# Patient Record
Sex: MALE | Race: ASIAN | NOT HISPANIC OR LATINO
[De-identification: names, ages, dates, MRNs, and addresses within clinical notes are randomized per-mention and may not be internally consistent; named-entity substitution may affect disease eponyms.]

---

## 2021-09-13 PROBLEM — Z00.00 ENCOUNTER FOR PREVENTIVE HEALTH EXAMINATION: Status: ACTIVE | Noted: 2021-09-13

## 2021-09-28 ENCOUNTER — APPOINTMENT (OUTPATIENT)
Dept: OTOLARYNGOLOGY | Facility: CLINIC | Age: 56
End: 2021-09-28
Payer: MEDICAID

## 2021-09-28 VITALS
HEIGHT: 73.23 IN | OXYGEN SATURATION: 98 % | SYSTOLIC BLOOD PRESSURE: 137 MMHG | WEIGHT: 269 LBS | DIASTOLIC BLOOD PRESSURE: 105 MMHG | HEART RATE: 84 BPM | BODY MASS INDEX: 35.27 KG/M2

## 2021-09-28 VITALS — TEMPERATURE: 97.2 F

## 2021-09-28 DIAGNOSIS — Z87.891 PERSONAL HISTORY OF NICOTINE DEPENDENCE: ICD-10-CM

## 2021-09-28 DIAGNOSIS — Z80.1 FAMILY HISTORY OF MALIGNANT NEOPLASM OF TRACHEA, BRONCHUS AND LUNG: ICD-10-CM

## 2021-09-28 DIAGNOSIS — B19.10 UNSPECIFIED VIRAL HEPATITIS B W/OUT HEPATIC COMA: ICD-10-CM

## 2021-09-28 PROCEDURE — 99204 OFFICE O/P NEW MOD 45 MIN: CPT

## 2021-09-28 RX ORDER — TENOFOVIR ALAFENAMIDE 25 MG/1
25 TABLET ORAL
Refills: 0 | Status: ACTIVE | COMMUNITY

## 2021-09-28 NOTE — PHYSICAL EXAM
[de-identified] : 5 cm soft mass of the submental area. [Midline] : trachea located in midline position [Normal] : no rashes

## 2021-09-28 NOTE — REASON FOR VISIT
[Initial Evaluation] : an initial evaluation for [Other: _____] : [unfilled] [[employer] ] : provided by [employer]   [FreeTextEntry2] : submandibular lipoma [Interpreters_IDNumber] : [id number] [Interpreters_FullName] : selene [FreeTextEntry3] : mandarin

## 2021-09-28 NOTE — CONSULT LETTER
[Dear  ___] : Dear  [unfilled], [Consult Letter:] : I had the pleasure of evaluating your patient, [unfilled]. [Please see my note below.] : Please see my note below. [Consult Closing:] : Thank you very much for allowing me to participate in the care of this patient.  If you have any questions, please do not hesitate to contact me. [Sincerely,] : Sincerely, [FreeTextEntry2] : Dr Julio Stevenson [FreeTextEntry3] : \par Bran Sanabria MD, FACS\par \par Otolaryngology-Head and Neck Surgery\par [employer] [facility]\par

## 2021-09-28 NOTE — HISTORY OF PRESENT ILLNESS
[de-identified] : 66 yro male pt referred by Dr. 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 Pt has had lipoma for 10 years, was much smaller before. Pt got CT done and subsequent US for thyroid. Denies pain, dysphagia, dyspnea, dysphonia. No fever, chills, weight loss. Eating and drinking without issues. \par Smoked 1 pack cig/day for 8 years. Quit 10 years ago. \par \par \par CT neck ([facility]) 9/6/2021:\par 1) left submandibular 7.4cm lipoma\par 2) indeterminate findings of right fossa of Rosenmuller effacement, asymmetric left lingual tonsil surface nodularity and left piriform sinus effacement. \par 3) left tracheoesophageal groove indeterminate 1.6cm nodular lesion\par 4) age indeterminate T5 vertebral body mild compression deformity. \par \par US thyroid ([facility]) 9/16/2021:\par -1.2cm hyperechoic lesion at the inferior aspect of the lower left pole.\par -cervical lymph nodes. \par PTH from 9/23/21 was 57.6 and Ca was 10.3.\par

## 2021-10-06 ENCOUNTER — OUTPATIENT (OUTPATIENT)
Dept: OUTPATIENT SERVICES | Facility: HOSPITAL | Age: 56
LOS: 1 days | End: 2021-10-06
Payer: MEDICAID

## 2021-10-06 VITALS
WEIGHT: 266.98 LBS | SYSTOLIC BLOOD PRESSURE: 138 MMHG | DIASTOLIC BLOOD PRESSURE: 86 MMHG | OXYGEN SATURATION: 98 % | RESPIRATION RATE: 18 BRPM | TEMPERATURE: 98 F | HEART RATE: 84 BPM | HEIGHT: 73 IN

## 2021-10-06 DIAGNOSIS — E21.3 HYPERPARATHYROIDISM, UNSPECIFIED: ICD-10-CM

## 2021-10-06 DIAGNOSIS — Z87.19 PERSONAL HISTORY OF OTHER DISEASES OF THE DIGESTIVE SYSTEM: Chronic | ICD-10-CM

## 2021-10-06 DIAGNOSIS — Z01.812 ENCOUNTER FOR PREPROCEDURAL LABORATORY EXAMINATION: ICD-10-CM

## 2021-10-06 DIAGNOSIS — B19.10 UNSPECIFIED VIRAL HEPATITIS B WITHOUT HEPATIC COMA: ICD-10-CM

## 2021-10-06 DIAGNOSIS — R06.00 DYSPNEA, UNSPECIFIED: ICD-10-CM

## 2021-10-06 LAB
ALBUMIN SERPL ELPH-MCNC: 4.9 G/DL — SIGNIFICANT CHANGE UP (ref 3.3–5)
ALP SERPL-CCNC: 56 U/L — SIGNIFICANT CHANGE UP (ref 40–120)
ALT FLD-CCNC: 13 U/L — SIGNIFICANT CHANGE UP (ref 4–41)
ANION GAP SERPL CALC-SCNC: 11 MMOL/L — SIGNIFICANT CHANGE UP (ref 7–14)
APTT BLD: 40.6 SEC — HIGH (ref 27–36.3)
AST SERPL-CCNC: 17 U/L — SIGNIFICANT CHANGE UP (ref 4–40)
BILIRUB SERPL-MCNC: 0.7 MG/DL — SIGNIFICANT CHANGE UP (ref 0.2–1.2)
BLD GP AB SCN SERPL QL: NEGATIVE — SIGNIFICANT CHANGE UP
BUN SERPL-MCNC: 13 MG/DL — SIGNIFICANT CHANGE UP (ref 7–23)
CALCIUM SERPL-MCNC: 10.3 MG/DL — SIGNIFICANT CHANGE UP (ref 8.4–10.5)
CHLORIDE SERPL-SCNC: 105 MMOL/L — SIGNIFICANT CHANGE UP (ref 98–107)
CO2 SERPL-SCNC: 23 MMOL/L — SIGNIFICANT CHANGE UP (ref 22–31)
CREAT SERPL-MCNC: 0.79 MG/DL — SIGNIFICANT CHANGE UP (ref 0.5–1.3)
GLUCOSE SERPL-MCNC: 108 MG/DL — HIGH (ref 70–99)
HCT VFR BLD CALC: 43.7 % — SIGNIFICANT CHANGE UP (ref 39–50)
HGB BLD-MCNC: 15.2 G/DL — SIGNIFICANT CHANGE UP (ref 13–17)
INR BLD: 1.06 RATIO — SIGNIFICANT CHANGE UP (ref 0.88–1.16)
MCHC RBC-ENTMCNC: 32.4 PG — SIGNIFICANT CHANGE UP (ref 27–34)
MCHC RBC-ENTMCNC: 34.8 GM/DL — SIGNIFICANT CHANGE UP (ref 32–36)
MCV RBC AUTO: 93.2 FL — SIGNIFICANT CHANGE UP (ref 80–100)
NRBC # BLD: 0 /100 WBCS — SIGNIFICANT CHANGE UP
NRBC # FLD: 0 K/UL — SIGNIFICANT CHANGE UP
PLATELET # BLD AUTO: 149 K/UL — LOW (ref 150–400)
POTASSIUM SERPL-MCNC: 4.2 MMOL/L — SIGNIFICANT CHANGE UP (ref 3.5–5.3)
POTASSIUM SERPL-SCNC: 4.2 MMOL/L — SIGNIFICANT CHANGE UP (ref 3.5–5.3)
PROT SERPL-MCNC: 8 G/DL — SIGNIFICANT CHANGE UP (ref 6–8.3)
PROTHROM AB SERPL-ACNC: 12.2 SEC — SIGNIFICANT CHANGE UP (ref 10.6–13.6)
RBC # BLD: 4.69 M/UL — SIGNIFICANT CHANGE UP (ref 4.2–5.8)
RBC # FLD: 12.4 % — SIGNIFICANT CHANGE UP (ref 10.3–14.5)
RH IG SCN BLD-IMP: POSITIVE — SIGNIFICANT CHANGE UP
SODIUM SERPL-SCNC: 139 MMOL/L — SIGNIFICANT CHANGE UP (ref 135–145)
WBC # BLD: 4.81 K/UL — SIGNIFICANT CHANGE UP (ref 3.8–10.5)
WBC # FLD AUTO: 4.81 K/UL — SIGNIFICANT CHANGE UP (ref 3.8–10.5)

## 2021-10-06 PROCEDURE — 93010 ELECTROCARDIOGRAM REPORT: CPT

## 2021-10-06 NOTE — H&P PST ADULT - NSICDXPASTMEDICALHX_GEN_ALL_CORE_FT
PAST MEDICAL HISTORY:  Benign lipomatous neoplasm of skin and subcutaneous tissue of head, face and neck     Hepatitis B on Vemlidy    Hyperparathyroidism, unspecified      PAST MEDICAL HISTORY:  Benign lipomatous neoplasm of skin and subcutaneous tissue of head, face and neck     Hepatitis B on Vemlidy    Hyperparathyroidism, unspecified     Obesity

## 2021-10-06 NOTE — H&P PST ADULT - CARDIOVASCULAR COMMENTS
Dad came in to  at . Verified with ID.  
Mom is req a 3 mo supply for both meds.    Adderall and Vyvanse      Last Written Prescription Date:  1-3-18, 10-9-17  Last Fill Quantity: 60, 30,   # refills: 0 for both  Last Office Visit: 1-17-18 throat pain, boxer's fx  Future Office visit:       Routing refill request to provider for review/approval because:  Drug not on the [facility], [facility] or [facility] refill protocol or controlled substance    Please advise, thanks.    Call mom when rx's ready for p/u.  
Parent called. Rx's at  for   
Reason for Call:  Medication or medication refill:    Do you use a [facility]?  Name of the pharmacy and phone number for the current request:  P/U    Name of the medication requested: Vivanse & adderall    Other request: Isabella is asking for a 3 month supply for both meds.    Can we leave a detailed message on this number? YES    Phone number patient can be reached at: Home number on file [phone] (home)    Best Time: any    Call taken on 1/31/2018 at 2:41 PM by Vannessa Mcqueen      
patient reports intermittent left sided chest pain radiating to left shoulder for several years

## 2021-10-06 NOTE — H&P PST ADULT - PROBLEM SELECTOR PLAN 1
Patient scheduled for parathyroidectomy with parathyroid hormone assay excision of tumor neck deep intramuscular on 10/13/2021  Written & verbal preop instructions, gi prophylaxis & surgical soap given  Pt verbalized good understanding.  Teach back done on surgical soap instructions.  DARY precaution recommended

## 2021-10-06 NOTE — H&P PST ADULT - PROBLEM SELECTOR PLAN 2
Patient reports dyspnea on exertion and intermittent left sided chest pain.  EKG done in chart.  Patient had preop Cardiology evaluation, copy requested, copy of Echo and Stress test requested.

## 2021-10-06 NOTE — H&P PST ADULT - PROBLEM SELECTOR PLAN 3
Patient instructed to take Vemlidy on AM of surgery with small sip of water. Patient had recent evaluation with GI, requesting last note.

## 2021-10-06 NOTE — H&P PST ADULT - NEGATIVE ENMT SYMPTOMS
Yes
no hearing difficulty/no ear pain/no tinnitus/no vertigo/no sinus symptoms/no nose bleeds/no gum bleeding/no throat pain/no dysphagia

## 2021-10-06 NOTE — H&P PST ADULT - OTHER CARE PROVIDERS
Dr. Christopher Stevenson ENT, Dr. Rivero, Dr. Terrell Cagle Dr. Zia Kim [facility] [phone], Dr. Rivero, Dr. Terrell Cagle  [phone] Dr. Zia Kim [facility] [phone], Dr. Rivero Cardiologist [phone], Dr. Terrell Cagle  [phone]

## 2021-10-06 NOTE — H&P PST ADULT - NSANTHOSAYNRD_GEN_A_CORE
No. DARY screening performed.  STOP BANG Legend: 0-2 = LOW Risk; 3-4 = INTERMEDIATE Risk; 5-8 = HIGH Risk

## 2021-10-06 NOTE — H&P PST ADULT - HISTORY OF PRESENT ILLNESS
57 yo male presents to [facility] for preop evaluation for parathyroidectomy with parathyroid hormone assay excision of tumor neck deep intramuscular.  Patient report lipoma on neck over that past ten years that has increased in size.  Patient had diagnostic imaging and also noted to have elevated calcium and PTH level.  Patient diagnosed with hyperparathyroidism unspecified. Patient denies throat pain or dysphagia.

## 2021-10-06 NOTE — H&P PST ADULT - NEGATIVE NEUROLOGICAL SYMPTOMS
no weakness/no generalized seizures/no focal seizures/no syncope/no tremors/no vertigo/no difficulty walking/no headache

## 2021-10-07 ENCOUNTER — APPOINTMENT (OUTPATIENT)
Dept: CT IMAGING | Facility: IMAGING CENTER | Age: 56
End: 2021-10-07
Payer: MEDICAID

## 2021-10-07 ENCOUNTER — OUTPATIENT (OUTPATIENT)
Dept: OUTPATIENT SERVICES | Facility: HOSPITAL | Age: 56
LOS: 1 days | End: 2021-10-07
Payer: MEDICAID

## 2021-10-07 DIAGNOSIS — D17.0 BENIGN LIPOMATOUS NEOPLASM OF SKIN AND SUBCUTANEOUS TISSUE OF HEAD, FACE AND NECK: ICD-10-CM

## 2021-10-07 PROBLEM — B19.10 UNSPECIFIED VIRAL HEPATITIS B WITHOUT HEPATIC COMA: Chronic | Status: ACTIVE | Noted: 2021-10-06

## 2021-10-07 PROBLEM — E21.3 HYPERPARATHYROIDISM, UNSPECIFIED: Chronic | Status: ACTIVE | Noted: 2021-10-06

## 2021-10-07 PROCEDURE — 70492 CT SFT TSUE NCK W/O & W/DYE: CPT

## 2021-10-07 PROCEDURE — 70491 CT SOFT TISSUE NECK W/DYE: CPT | Mod: 26

## 2021-10-07 PROCEDURE — 82565 ASSAY OF CREATININE: CPT

## 2021-10-10 ENCOUNTER — APPOINTMENT (OUTPATIENT)
Dept: DISASTER EMERGENCY | Facility: CLINIC | Age: 56
End: 2021-10-10

## 2021-10-10 DIAGNOSIS — Z01.818 ENCOUNTER FOR OTHER PREPROCEDURAL EXAMINATION: ICD-10-CM

## 2021-10-11 LAB — SARS-COV-2 N GENE NPH QL NAA+PROBE: NOT DETECTED

## 2021-10-12 ENCOUNTER — TRANSCRIPTION ENCOUNTER (OUTPATIENT)
Age: 56
End: 2021-10-12

## 2021-10-12 NOTE — ASU PATIENT PROFILE, ADULT - NSICDXPASTMEDICALHX_GEN_ALL_CORE_FT
PAST MEDICAL HISTORY:  Benign lipomatous neoplasm of skin and subcutaneous tissue of head, face and neck     Hepatitis B on Vemlidy    Hyperparathyroidism, unspecified     Obesity

## 2021-10-13 ENCOUNTER — OUTPATIENT (OUTPATIENT)
Dept: OUTPATIENT SERVICES | Facility: HOSPITAL | Age: 56
LOS: 1 days | Discharge: HOME | End: 2021-10-13
Payer: MEDICAID

## 2021-10-13 ENCOUNTER — APPOINTMENT (OUTPATIENT)
Dept: OTOLARYNGOLOGY | Facility: HOSPITAL | Age: 56
End: 2021-10-13

## 2021-10-13 ENCOUNTER — RESULT REVIEW (OUTPATIENT)
Age: 56
End: 2021-10-13

## 2021-10-13 VITALS
HEIGHT: 73 IN | WEIGHT: 264.55 LBS | SYSTOLIC BLOOD PRESSURE: 140 MMHG | DIASTOLIC BLOOD PRESSURE: 89 MMHG | HEART RATE: 94 BPM | OXYGEN SATURATION: 97 % | TEMPERATURE: 98 F | RESPIRATION RATE: 18 BRPM

## 2021-10-13 VITALS
SYSTOLIC BLOOD PRESSURE: 131 MMHG | DIASTOLIC BLOOD PRESSURE: 87 MMHG | TEMPERATURE: 97 F | HEART RATE: 94 BPM | OXYGEN SATURATION: 100 % | RESPIRATION RATE: 16 BRPM

## 2021-10-13 DIAGNOSIS — E21.3 HYPERPARATHYROIDISM, UNSPECIFIED: ICD-10-CM

## 2021-10-13 LAB
PTH INTACT, INTRAOP SPECIMEN 2: SIGNIFICANT CHANGE UP
PTH INTACT, INTRAOP SPECIMEN 3: SIGNIFICANT CHANGE UP
PTH INTACT, INTRAOP SPECIMEN 4: SIGNIFICANT CHANGE UP
PTH INTACT, INTRAOP TIMING 2: SIGNIFICANT CHANGE UP
PTH INTACT, INTRAOP TIMING 3: SIGNIFICANT CHANGE UP
PTH INTACT, INTRAOP TIMING 4: SIGNIFICANT CHANGE UP
PTH INTACT, INTRAOPERATIVE 2: 113 PG/ML — HIGH (ref 15–65)
PTH INTACT, INTRAOPERATIVE 3: 103 PG/ML — HIGH (ref 15–65)
PTH INTACT, INTRAOPERATIVE 4: 96 PG/ML — HIGH (ref 15–65)
PTH-INTACT IO % DIF SERPL: 83 PG/ML — HIGH (ref 15–65)
RH IG SCN BLD-IMP: POSITIVE — SIGNIFICANT CHANGE UP

## 2021-10-13 PROCEDURE — 88331 PATH CONSLTJ SURG 1 BLK 1SPC: CPT | Mod: 26

## 2021-10-13 PROCEDURE — 21554 EXC NECK TUM DEEP 5 CM/>: CPT

## 2021-10-13 PROCEDURE — 60500 EXPLORE PARATHYROID GLANDS: CPT

## 2021-10-13 PROCEDURE — 88305 TISSUE EXAM BY PATHOLOGIST: CPT | Mod: 26

## 2021-10-13 PROCEDURE — 21558 RESECT NECK TUMOR 5 CM/>: CPT

## 2021-10-13 RX ORDER — METOCLOPRAMIDE HCL 10 MG
10 TABLET ORAL ONCE
Refills: 0 | Status: DISCONTINUED | OUTPATIENT
Start: 2021-10-13 | End: 2021-10-27

## 2021-10-13 RX ORDER — ONDANSETRON 8 MG/1
4 TABLET, FILM COATED ORAL ONCE
Refills: 0 | Status: DISCONTINUED | OUTPATIENT
Start: 2021-10-13 | End: 2021-10-27

## 2021-10-13 RX ORDER — HYDROMORPHONE HYDROCHLORIDE 2 MG/ML
0.5 INJECTION INTRAMUSCULAR; INTRAVENOUS; SUBCUTANEOUS
Refills: 0 | Status: DISCONTINUED | OUTPATIENT
Start: 2021-10-13 | End: 2021-10-13

## 2021-10-13 RX ORDER — OXYCODONE HYDROCHLORIDE 5 MG/1
5 TABLET ORAL ONCE
Refills: 0 | Status: DISCONTINUED | OUTPATIENT
Start: 2021-10-13 | End: 2021-10-13

## 2021-10-13 RX ORDER — OXYCODONE HYDROCHLORIDE 5 MG/1
1 TABLET ORAL
Qty: 12 | Refills: 0
Start: 2021-10-13

## 2021-10-13 NOTE — ASU DISCHARGE PLAN (ADULT/PEDIATRIC) - FOLLOW UP APPOINTMENTS
or go to the nearest emergency room/911 or go to the nearest emergency room/[facility]

## 2021-10-13 NOTE — BRIEF OPERATIVE NOTE - NSICDXBRIEFPROCEDURE_GEN_ALL_CORE_FT
PROCEDURES:  Bilateral parathyroidectomy 13-Oct-2021 10:39:57  Brenda Nickerson  Removal of lipoma of neck, 3 cm or greater 13-Oct-2021 10:41:34  Brenda Nickerson

## 2021-10-13 NOTE — BRIEF OPERATIVE NOTE - NSICDXBRIEFPREOP_GEN_ALL_CORE_FT
PRE-OP DIAGNOSIS:  Lipoma 13-Oct-2021 10:42:07  Brenda Nickerson  Hyperparathyroidism 13-Oct-2021 10:42:26  Brenda Nickerson

## 2021-10-13 NOTE — ASU DISCHARGE PLAN (ADULT/PEDIATRIC) - CALL YOUR DOCTOR IF YOU HAVE ANY OF THE FOLLOWING:
Bleeding that does not stop/Swelling that gets worse/Wound/Surgical Site with redness, or foul smelling discharge or pus Bleeding that does not stop/Swelling that gets worse/Wound/Surgical Site with redness, or foul smelling discharge or pus/Numbness, tingling, color or temperature change to extremity/Nausea and vomiting that does not stop/Unable to urinate/Inability to tolerate liquids or foods/Increased irritability or sluggishness

## 2021-10-13 NOTE — ASU DISCHARGE PLAN (ADULT/PEDIATRIC) - CARE PROVIDER_API CALL
Bran Sanabria)  Otolaryngology  [address] [place] [address]  Phone: [phone]  Fax: [phone]  Established Patient  Follow Up Time:

## 2021-10-13 NOTE — BRIEF OPERATIVE NOTE - NSICDXBRIEFPOSTOP_GEN_ALL_CORE_FT
POST-OP DIAGNOSIS:  Hyperparathyroidism 13-Oct-2021 10:42:37  Brenda Nickerson  Lipoma 13-Oct-2021 10:42:47  Brenda Nickerson

## 2021-10-15 ENCOUNTER — APPOINTMENT (OUTPATIENT)
Dept: OTOLARYNGOLOGY | Facility: CLINIC | Age: 56
End: 2021-10-15
Payer: MEDICAID

## 2021-10-15 PROCEDURE — 99024 POSTOP FOLLOW-UP VISIT: CPT

## 2021-10-15 NOTE — HISTORY OF PRESENT ILLNESS
[de-identified] : 56 yro male pt here for drain removal s/p parathyroidectomy and excision of tumor neck deep intramuscular on 10/13/2021. Drain has less than 20cc of serosanguineous fluid over the last 24 hours. Pt has some throat tenderness. Denies dysphagia, dyspnea, dysphonia. No fever, chills, or tingling in extremities.

## 2021-10-17 ENCOUNTER — TRANSCRIPTION ENCOUNTER (OUTPATIENT)
Age: 56
End: 2021-10-17

## 2021-10-18 LAB — SURGICAL PATHOLOGY STUDY: SIGNIFICANT CHANGE UP

## 2021-11-04 ENCOUNTER — APPOINTMENT (OUTPATIENT)
Dept: OTOLARYNGOLOGY | Facility: CLINIC | Age: 56
End: 2021-11-04
Payer: MEDICAID

## 2021-11-04 VITALS
TEMPERATURE: 96.9 F | DIASTOLIC BLOOD PRESSURE: 95 MMHG | WEIGHT: 269 LBS | SYSTOLIC BLOOD PRESSURE: 150 MMHG | HEIGHT: 73 IN | HEART RATE: 94 BPM | BODY MASS INDEX: 35.65 KG/M2

## 2021-11-04 PROCEDURE — 99024 POSTOP FOLLOW-UP VISIT: CPT

## 2021-11-04 NOTE — HISTORY OF PRESENT ILLNESS
[de-identified] : 56 yro male pt referred by Dr. 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 Pt here for follow-up  s/p parathyroidectomy and excision of tumor neck deep intramuscular on 10/13/2021.  Patient reports constant odynophagia on left side of neck when eating meals and drink. Denies pain, dysphagia, dyspnea, dysphonia, fever, chills or weight loss. \par \par \par surgical path 10/13/2021:\par Final Diagnosis\par \par 1.  Parathyroid, left inferior, parathyroidectomy\par \par - Hypercellular parathyroid tissue\par \par 2. Soft tissue, left neck mass, excision\par - Lipoma\par - 1 tiny benign lymph node\par \par 3. Parathyroid, rising  inferior, parathyroidectomy\par - Hypercellular parathyroid tissue\par \par \par CT neck ([facility]) 9/6/2021:\par 1) left submandibular 7.4cm lipoma\par 2) indeterminate findings of right fossa of Rosenmuller effacement, asymmetric left lingual tonsil surface nodularity and left piriform sinus effacement. \par 3) left tracheoesophageal groove indeterminate 1.6cm nodular lesion\par 4) age indeterminate T5 vertebral body mild compression deformity. \par \par US thyroid ([facility]) 9/16/2021:\par -1.2cm hyperechoic lesion at the inferior aspect of the lower left pole.\par -cervical lymph nodes. \par PTH from 9/23/21 was 57.6 and Ca was 10.3.\par

## 2021-11-04 NOTE — CONSULT LETTER
[Dear  ___] : Dear  [unfilled], [Courtesy Letter:] : I had the pleasure of seeing your patient, [unfilled], in my office today. [Please see my note below.] : Please see my note below. [Consult Closing:] : Thank you very much for allowing me to participate in the care of this patient.  If you have any questions, please do not hesitate to contact me. [Sincerely,] : Sincerely, [FreeTextEntry2] : Dr Julio Stevenson [FreeTextEntry3] : \par Bran Sanabria MD, FACS\par \par Otolaryngology-Head and Neck Surgery\par [employer] [facility]\par

## 2021-11-04 NOTE — PHYSICAL EXAM
[Midline] : trachea located in midline position [Normal] : no rashes [de-identified] : Incision C/D/I.

## 2021-11-04 NOTE — REASON FOR VISIT
[Subsequent Evaluation] : a subsequent evaluation for [Other: _____] : [unfilled] [[employer] ] : provided by [employer]   [FreeTextEntry2] : submandibular lipoma [Interpreters_IDNumber] : [id number] [Interpreters_FullName] : selene [FreeTextEntry3] : mandarin

## 2021-11-17 LAB
25(OH)D3 SERPL-MCNC: 23.5 NG/ML
CALCIUM SERPL-MCNC: 10.5 MG/DL
CALCIUM SERPL-MCNC: 10.5 MG/DL
PARATHYROID HORMONE INTACT: 75 PG/ML

## 2021-12-01 ENCOUNTER — APPOINTMENT (OUTPATIENT)
Dept: ENDOCRINOLOGY | Facility: CLINIC | Age: 56
End: 2021-12-01
Payer: MEDICAID

## 2021-12-01 VITALS
OXYGEN SATURATION: 94 % | SYSTOLIC BLOOD PRESSURE: 141 MMHG | HEIGHT: 73.03 IN | HEART RATE: 89 BPM | BODY MASS INDEX: 34.83 KG/M2 | DIASTOLIC BLOOD PRESSURE: 88 MMHG | WEIGHT: 262.76 LBS | TEMPERATURE: 97.8 F

## 2021-12-01 DIAGNOSIS — M43.9 DEFORMING DORSOPATHY, UNSPECIFIED: ICD-10-CM

## 2021-12-01 DIAGNOSIS — E55.9 VITAMIN D DEFICIENCY, UNSPECIFIED: ICD-10-CM

## 2021-12-01 DIAGNOSIS — E21.3 HYPERPARATHYROIDISM, UNSPECIFIED: ICD-10-CM

## 2021-12-01 PROCEDURE — 99205 OFFICE O/P NEW HI 60 MIN: CPT

## 2021-12-03 ENCOUNTER — OUTPATIENT (OUTPATIENT)
Dept: OUTPATIENT SERVICES | Facility: HOSPITAL | Age: 56
LOS: 1 days | End: 2021-12-03
Payer: MEDICAID

## 2021-12-03 ENCOUNTER — APPOINTMENT (OUTPATIENT)
Dept: RADIOLOGY | Facility: IMAGING CENTER | Age: 56
End: 2021-12-03
Payer: MEDICAID

## 2021-12-03 DIAGNOSIS — E21.3 HYPERPARATHYROIDISM, UNSPECIFIED: ICD-10-CM

## 2021-12-03 DIAGNOSIS — Z00.8 ENCOUNTER FOR OTHER GENERAL EXAMINATION: ICD-10-CM

## 2021-12-03 PROBLEM — M43.9 COMPRESSION DEFORMITY OF VERTEBRA: Status: ACTIVE | Noted: 2021-12-03

## 2021-12-03 PROBLEM — E55.9 VITAMIN D INSUFFICIENCY: Status: ACTIVE | Noted: 2021-12-03

## 2021-12-03 PROCEDURE — 77080 DXA BONE DENSITY AXIAL: CPT | Mod: 26

## 2021-12-03 PROCEDURE — 77080 DXA BONE DENSITY AXIAL: CPT

## 2021-12-03 NOTE — REASON FOR VISIT
[Consultation] : a consultation visit [Thyroid nodule/ MNG] : thyroid nodule/ MNG [FreeTextEntry2] : Dr Sanabria

## 2021-12-03 NOTE — HISTORY OF PRESENT ILLNESS
[FreeTextEntry1] : He is accompanied by his son Niko who provides translation.\par CC: Parathyroid check\par This is a 56-year-old male with hepatitis B, T5 compression deformity, kidney stone, vitamin D insufficiency, elevated parathyroid hormone level, here for consultation.\par CT neck on September 9, 2021 showed left submandibular lipoma, 1.6 cm nodular lesion which may represent thyroid tissue/nodule, lymph node versus prominent parathyroid tissue, and age indeterminate T5 vertebral body mild compression deformity.  Ultrasound thyroid from September 16, 2021 showed left inferior lower pole 1.1 x 0.6 x 1.2 cm hyperechoic lesion.  CT parathyroid on October 14, 2021 showed 0.5 x 0.7 x 1.7 cm right possible parathyroid adenoma and left 0.8 x 0.6 x 1.2 cm nodule, suspicious for parathyroid adenoma. \par He is status post left inferior parathyroidectomy and right inferior parathyroidectomy, pathology consistent with hypercellular parathyroid tissue.  PTH did not decrease intraoperatively.\par He started taking vitamin D 1000 IU daily 10 days ago.\par He is not on calcium supplements.\par He reports bone pain, fatigue, difficulty concentrating, and frequent urination.  He also has left knee pain, right hip pain, right elbow pain.

## 2021-12-03 NOTE — ASSESSMENT
[FreeTextEntry1] : This is a 56-year-old male with hepatitis B, T5 compression deformity, kidney stone, vitamin D insufficiency, elevated parathyroid hormone level, here for consultation.\par CT neck on September 9, 2021 showed left submandibular lipoma, 1.6 cm nodular lesion which may represent thyroid tissue/nodule, lymph node versus prominent parathyroid tissue, and age indeterminate T5 vertebral body mild compression deformity.\par Ultrasound thyroid from September 16, 2021 showed left inferior lower pole 1.1 x 0.6 x 1.2 cm hyperechoic lesion. CT parathyroid on October 14, 2021 showed 0.5 x 0.7 x 1.7 cm right possible parathyroid adenoma and left 0.8 x 0.6 x 1.2 cm nodule, suspicious for parathyroid adenoma. \par He is status post left inferior parathyroidectomy and right inferior parathyroidectomy, pathology consistent with hypercellular parathyroid tissue.  PTH did not decrease intraoperatively.\par PTH elevation may be due to vitamin D insufficiency.\par He started taking vitamin D 1000 IU daily 10 days ago.  Increase vitamin D to 2000 IU daily.  Check 25 vitamin D and PTH in 3 months.\par Referred to rheumatology for joint pain.\par Check DEXA/vertebral fracture assessment as T5 compression deformity noted on CT scan.

## 2021-12-03 NOTE — PHYSICAL EXAM
[Alert] : alert [Well Nourished] : well nourished [Healthy Appearance] : healthy appearance [Obese] : obese [No Acute Distress] : no acute distress [Well Developed] : well developed [Normal Voice/Communication] : normal voice communication [Normal Sclera/Conjunctiva] : normal sclera/conjunctiva [No Proptosis] : no proptosis [No Neck Mass] : no neck mass was observed [No LAD] : no lymphadenopathy [Supple] : the neck was supple [Well Healed Scar] : well healed scar [No Respiratory Distress] : no respiratory distress [Normal Rate] : heart rate was normal [Normal Affect] : the affect was normal [Normal Insight/Judgement] : insight and judgment were intact [Normal Mood] : the mood was normal

## 2021-12-08 DIAGNOSIS — M85.80 OTHER SPECIFIED DISORDERS OF BONE DENSITY AND STRUCTURE, UNSPECIFIED SITE: ICD-10-CM

## 2021-12-16 ENCOUNTER — OUTPATIENT (OUTPATIENT)
Dept: OUTPATIENT SERVICES | Facility: HOSPITAL | Age: 56
LOS: 1 days | End: 2021-12-16
Payer: MEDICAID

## 2021-12-16 ENCOUNTER — APPOINTMENT (OUTPATIENT)
Dept: RADIOLOGY | Facility: IMAGING CENTER | Age: 56
End: 2021-12-16

## 2021-12-16 DIAGNOSIS — E21.3 HYPERPARATHYROIDISM, UNSPECIFIED: ICD-10-CM

## 2021-12-16 PROCEDURE — 77085 DXA BONE DENSITY AXL VRT FX: CPT

## 2021-12-16 PROCEDURE — 77086 VRT FRACTURE ASSMT VIA DXA: CPT | Mod: 26

## 2021-12-16 PROCEDURE — 77086 VRT FRACTURE ASSMT VIA DXA: CPT

## 2022-01-25 ENCOUNTER — RESULT REVIEW (OUTPATIENT)
Age: 57
End: 2022-01-25

## 2022-01-25 ENCOUNTER — APPOINTMENT (OUTPATIENT)
Dept: RHEUMATOLOGY | Facility: CLINIC | Age: 57
End: 2022-01-25
Payer: MEDICAID

## 2022-01-25 ENCOUNTER — LABORATORY RESULT (OUTPATIENT)
Age: 57
End: 2022-01-25

## 2022-01-25 VITALS — DIASTOLIC BLOOD PRESSURE: 96 MMHG | HEART RATE: 82 BPM | OXYGEN SATURATION: 96 % | SYSTOLIC BLOOD PRESSURE: 137 MMHG

## 2022-01-25 DIAGNOSIS — M25.50 PAIN IN UNSPECIFIED JOINT: ICD-10-CM

## 2022-01-25 PROCEDURE — 99214 OFFICE O/P EST MOD 30 MIN: CPT

## 2022-01-25 PROCEDURE — 99204 OFFICE O/P NEW MOD 45 MIN: CPT | Mod: 25

## 2022-01-25 NOTE — ASSESSMENT
[FreeTextEntry1] : 56-year-old male with intermittent polyarthralgias/tendinitis more likely related to degenerative arthritis rather than inflammatory arthritis.  Likely to be related to patient's history of hyperparathyroidism\par \par Plan\par -check inflammatory arthritis labs\par -check xrays of hands, feet\par -PT referral\par -Tylenol arthritis as needed\par

## 2022-01-25 NOTE — HISTORY OF PRESENT ILLNESS
[FreeTextEntry1] : Mr. Villareal is a 56M presenting for joint pain. 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

## 2022-01-25 NOTE — PHYSICAL EXAM
[General Appearance - Alert] : alert [General Appearance - In No Acute Distress] : in no acute distress [General Appearance - Well Nourished] : well nourished [Musculoskeletal - Swelling] : no joint swelling seen [FreeTextEntry1] : no evidence of TTP of shoulders, hips, lumbar spine, knees [] : no rash [Sensation] : the sensory exam was normal to light touch and pinprick [Motor Exam] : the motor exam was normal [Oriented To Time, Place, And Person] : oriented to person, place, and time

## 2022-02-01 LAB
ALBUMIN SERPL ELPH-MCNC: 4.7 G/DL
ALP BLD-CCNC: 58 U/L
ALT SERPL-CCNC: 11 U/L
ANION GAP SERPL CALC-SCNC: 15 MMOL/L
AST SERPL-CCNC: 15 U/L
BASOPHILS # BLD AUTO: 0.04 K/UL
BASOPHILS NFR BLD AUTO: 0.7 %
BILIRUB SERPL-MCNC: 0.4 MG/DL
BUN SERPL-MCNC: 17 MG/DL
CALCIUM SERPL-MCNC: 10.4 MG/DL
CCP AB SER IA-ACNC: <8 UNITS
CHLORIDE SERPL-SCNC: 103 MMOL/L
CO2 SERPL-SCNC: 20 MMOL/L
CREAT SERPL-MCNC: 0.77 MG/DL
CRP SERPL-MCNC: <3 MG/L
EOSINOPHIL # BLD AUTO: 0.16 K/UL
EOSINOPHIL NFR BLD AUTO: 2.9 %
ERYTHROCYTE [SEDIMENTATION RATE] IN BLOOD BY WESTERGREN METHOD: 19 MM/HR
GLUCOSE SERPL-MCNC: 111 MG/DL
HCT VFR BLD CALC: 44.6 %
HGB BLD-MCNC: 15 G/DL
IMM GRANULOCYTES NFR BLD AUTO: 0.2 %
LYMPHOCYTES # BLD AUTO: 1.85 K/UL
LYMPHOCYTES NFR BLD AUTO: 33.9 %
MAN DIFF?: NORMAL
MCHC RBC-ENTMCNC: 32.5 PG
MCHC RBC-ENTMCNC: 33.6 GM/DL
MCV RBC AUTO: 96.5 FL
MONOCYTES # BLD AUTO: 0.28 K/UL
MONOCYTES NFR BLD AUTO: 5.1 %
NEUTROPHILS # BLD AUTO: 3.12 K/UL
NEUTROPHILS NFR BLD AUTO: 57.2 %
PLATELET # BLD AUTO: 153 K/UL
POTASSIUM SERPL-SCNC: 4.3 MMOL/L
PROT SERPL-MCNC: 7.5 G/DL
RBC # BLD: 4.62 M/UL
RBC # FLD: 12.5 %
RF+CCP IGG SER-IMP: NEGATIVE
RHEUMATOID FACT SER QL: <10 IU/ML
SODIUM SERPL-SCNC: 139 MMOL/L
WBC # FLD AUTO: 5.46 K/UL

## 2022-02-03 ENCOUNTER — APPOINTMENT (OUTPATIENT)
Dept: VASCULAR SURGERY | Facility: CLINIC | Age: 57
End: 2022-02-03
Payer: MEDICAID

## 2022-02-03 PROCEDURE — 93971 EXTREMITY STUDY: CPT

## 2022-02-03 PROCEDURE — 99203 OFFICE O/P NEW LOW 30 MIN: CPT

## 2022-02-03 NOTE — ASSESSMENT
[FreeTextEntry1] : In the office today patient underwent a duplex study which shows no evidence of DVT in addition, there is a large greater saphenous vein junction with insufficiency with large varicose veins and collaterals throughout the leg.  At this time would recommend ongoing compression and patient will follow-up in 90 days and we will assess how he is doing.  If he fails to improve he may be a candidate for vein ablation.

## 2022-02-03 NOTE — HISTORY OF PRESENT ILLNESS
[FreeTextEntry1] : 56-year-old gentleman with a history of chronic hepatitis B presents to the office with a long history of varicosities.  Patient states he has chronic itching of his right medial calf.  He has noticed discoloration around the right ankle.  Patient has no history of DVT or any intervention on the right lower extremity.  He is here for evaluation.

## 2022-02-03 NOTE — PHYSICAL EXAM
[JVD] : no jugular venous distention  [Normal Breath Sounds] : Normal breath sounds [Normal Rate and Rhythm] : normal rate and rhythm [2+] : left 2+ [Ankle Swelling (On Exam)] : not present [Varicose Veins Of Lower Extremities] : present [Varicose Veins Of The Right Leg] : of the right leg [Ankle Swelling Bilaterally] : severe [] : of the right leg [Ankle Swelling On The Right] : mild [Abdomen Tenderness] : ~T ~M No abdominal tenderness [No Rash or Lesion] : No rash or lesion [Alert] : alert [Calm] : calm [de-identified] : Appears well

## 2022-03-03 ENCOUNTER — APPOINTMENT (OUTPATIENT)
Dept: ENDOCRINOLOGY | Facility: CLINIC | Age: 57
End: 2022-03-03

## 2022-04-15 ENCOUNTER — APPOINTMENT (OUTPATIENT)
Dept: RADIOLOGY | Facility: IMAGING CENTER | Age: 57
End: 2022-04-15
Payer: MEDICAID

## 2022-04-15 ENCOUNTER — RESULT REVIEW (OUTPATIENT)
Age: 57
End: 2022-04-15

## 2022-04-15 ENCOUNTER — OUTPATIENT (OUTPATIENT)
Dept: OUTPATIENT SERVICES | Facility: HOSPITAL | Age: 57
LOS: 1 days | End: 2022-04-15
Payer: MEDICAID

## 2022-04-15 DIAGNOSIS — M43.9 DEFORMING DORSOPATHY, UNSPECIFIED: ICD-10-CM

## 2022-04-15 DIAGNOSIS — M85.80 OTHER SPECIFIED DISORDERS OF BONE DENSITY AND STRUCTURE, UNSPECIFIED SITE: ICD-10-CM

## 2022-04-15 DIAGNOSIS — M25.50 PAIN IN UNSPECIFIED JOINT: ICD-10-CM

## 2022-04-15 DIAGNOSIS — Z00.8 ENCOUNTER FOR OTHER GENERAL EXAMINATION: ICD-10-CM

## 2022-04-15 PROCEDURE — 72070 X-RAY EXAM THORAC SPINE 2VWS: CPT | Mod: 26

## 2022-04-15 PROCEDURE — 72100 X-RAY EXAM L-S SPINE 2/3 VWS: CPT | Mod: 26

## 2022-04-15 PROCEDURE — 72100 X-RAY EXAM L-S SPINE 2/3 VWS: CPT

## 2022-04-15 PROCEDURE — 73522 X-RAY EXAM HIPS BI 3-4 VIEWS: CPT | Mod: 26

## 2022-04-15 PROCEDURE — 73070 X-RAY EXAM OF ELBOW: CPT | Mod: 26,LT,RT

## 2022-04-15 PROCEDURE — 73562 X-RAY EXAM OF KNEE 3: CPT | Mod: 26,LT,RT

## 2022-04-15 PROCEDURE — 73522 X-RAY EXAM HIPS BI 3-4 VIEWS: CPT

## 2022-04-15 PROCEDURE — 73562 X-RAY EXAM OF KNEE 3: CPT

## 2022-04-15 PROCEDURE — 73070 X-RAY EXAM OF ELBOW: CPT

## 2022-04-15 PROCEDURE — 72070 X-RAY EXAM THORAC SPINE 2VWS: CPT

## 2022-04-18 ENCOUNTER — NON-APPOINTMENT (OUTPATIENT)
Age: 57
End: 2022-04-18

## 2022-05-04 PROBLEM — E21.3 HYPERPARATHYROIDISM: Status: ACTIVE | Noted: 2022-05-04

## 2022-05-04 PROBLEM — D17.0 LIPOMA OF NECK: Status: ACTIVE | Noted: 2021-09-28

## 2022-05-05 ENCOUNTER — APPOINTMENT (OUTPATIENT)
Dept: OTOLARYNGOLOGY | Facility: CLINIC | Age: 57
End: 2022-05-05
Payer: MEDICAID

## 2022-05-05 VITALS
HEART RATE: 89 BPM | DIASTOLIC BLOOD PRESSURE: 96 MMHG | SYSTOLIC BLOOD PRESSURE: 136 MMHG | WEIGHT: 282 LBS | BODY MASS INDEX: 37.37 KG/M2 | HEIGHT: 73.03 IN

## 2022-05-05 DIAGNOSIS — E21.3 HYPERPARATHYROIDISM, UNSPECIFIED: ICD-10-CM

## 2022-05-05 DIAGNOSIS — D17.0 BENIGN LIPOMATOUS NEOPLASM OF SKIN AND SUBCUTANEOUS TISSUE OF HEAD, FACE AND NECK: ICD-10-CM

## 2022-05-05 PROCEDURE — 99214 OFFICE O/P EST MOD 30 MIN: CPT

## 2022-05-05 NOTE — PHYSICAL EXAM
[Midline] : trachea located in midline position [Normal] : no rashes [de-identified] : Incision well healed.

## 2022-05-05 NOTE — CONSULT LETTER
[Dear  ___] : Dear  [unfilled], [Courtesy Letter:] : I had the pleasure of seeing your patient, [unfilled], in my office today. [Please see my note below.] : Please see my note below. [Consult Closing:] : Thank you very much for allowing me to participate in the care of this patient.  If you have any questions, please do not hesitate to contact me. [Sincerely,] : Sincerely, [FreeTextEntry2] : Dr Julio Stevenson [FreeTextEntry3] : \par Bran Sanabria MD, FACS\par \par Otolaryngology-Head and Neck Surgery\par [employer] [facility]\par

## 2022-05-05 NOTE — HISTORY OF PRESENT ILLNESS
[de-identified] : 56 yro male pt referred by Dr. 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  S/p parathyroidectomy and excision of tumor neck deep intramuscular on 10/13/2021. Here for 6 month follow-up. \par 11/2021: iPTH was 75 and calcium 10.5 \par \par Complete review of systems which was performed during a previous encounter was reviewed with the patient and there are no changes except as stated in the HPI section.\par

## 2022-05-05 NOTE — REASON FOR VISIT
[Subsequent Evaluation] : a subsequent evaluation for [Other: _____] : [unfilled] [FreeTextEntry2] : submandibular lipoma

## 2022-05-16 LAB
25(OH)D3 SERPL-MCNC: 29.2 NG/ML
CA-I SERPL-SCNC: 5.2 MG/DL
CALCIUM SERPL-MCNC: 10.1 MG/DL
CALCIUM SERPL-MCNC: 10.1 MG/DL
PARATHYROID HORMONE INTACT: 73 PG/ML

## 2022-05-19 ENCOUNTER — APPOINTMENT (OUTPATIENT)
Dept: VASCULAR SURGERY | Facility: CLINIC | Age: 57
End: 2022-05-19
Payer: MEDICAID

## 2022-05-19 VITALS
BODY MASS INDEX: 30.75 KG/M2 | DIASTOLIC BLOOD PRESSURE: 92 MMHG | TEMPERATURE: 97.9 F | HEIGHT: 73 IN | SYSTOLIC BLOOD PRESSURE: 130 MMHG | WEIGHT: 232 LBS

## 2022-05-19 DIAGNOSIS — I83.90 ASYMPTOMATIC VARICOSE VEINS OF UNSPECIFIED LOWER EXTREMITY: ICD-10-CM

## 2022-05-19 PROCEDURE — 99213 OFFICE O/P EST LOW 20 MIN: CPT

## 2022-05-19 NOTE — PHYSICAL EXAM
[2+] : left 2+ [Varicose Veins Of Lower Extremities] : present [Varicose Veins Of The Right Leg] : of the right leg [Ankle Swelling Bilaterally] : severe [No Rash or Lesion] : No rash or lesion [Alert] : alert [Calm] : calm [JVD] : no jugular venous distention  [Ankle Swelling (On Exam)] : not present [] : not present [Skin Ulcer] : no ulcer [de-identified] : appears well  [de-identified] : no calf tenderness, no palpable cords

## 2022-05-19 NOTE — ASSESSMENT
[FreeTextEntry1] : 55 yo male with history of chronic hep b presents for follow up of right lower extremity varicose veins\par \par venous duplex showed severe venous insufficiency of the right gsv given persistent discomfort of the right lower extremity that is interfering with his daily life would recommend ablation with stab phlebectomy \par \par

## 2022-05-19 NOTE — HISTORY OF PRESENT ILLNESS
[FreeTextEntry1] : 57 yo male with history of chronic hep b presents for follow up of right lower extremity varicose veins.  pt states that the veins are still bothersome and interfere with his daily life.  pt states that the pain slows him down and his is not able to complete certain daily tasks.  pt denies any history of dvt, ulcers or bleeding

## 2022-06-06 LAB
ANION GAP SERPL CALC-SCNC: 12 MMOL/L
BASOPHILS # BLD AUTO: 0.04 K/UL
BASOPHILS NFR BLD AUTO: 0.8 %
BUN SERPL-MCNC: 17 MG/DL
CALCIUM SERPL-MCNC: 10 MG/DL
CHLORIDE SERPL-SCNC: 104 MMOL/L
CO2 SERPL-SCNC: 24 MMOL/L
CREAT SERPL-MCNC: 0.85 MG/DL
EGFR: 101 ML/MIN/1.73M2
EOSINOPHIL # BLD AUTO: 0.13 K/UL
EOSINOPHIL NFR BLD AUTO: 2.6 %
GLUCOSE SERPL-MCNC: 111 MG/DL
HCT VFR BLD CALC: 43 %
HGB BLD-MCNC: 14.4 G/DL
IMM GRANULOCYTES NFR BLD AUTO: 0.2 %
LYMPHOCYTES # BLD AUTO: 2.1 K/UL
LYMPHOCYTES NFR BLD AUTO: 42.4 %
MAN DIFF?: NORMAL
MCHC RBC-ENTMCNC: 31.3 PG
MCHC RBC-ENTMCNC: 33.5 GM/DL
MCV RBC AUTO: 93.5 FL
MONOCYTES # BLD AUTO: 0.28 K/UL
MONOCYTES NFR BLD AUTO: 5.7 %
NEUTROPHILS # BLD AUTO: 2.39 K/UL
NEUTROPHILS NFR BLD AUTO: 48.3 %
PLATELET # BLD AUTO: 164 K/UL
POTASSIUM SERPL-SCNC: 4.3 MMOL/L
RBC # BLD: 4.6 M/UL
RBC # FLD: 12 %
SARS-COV-2 N GENE NPH QL NAA+PROBE: NOT DETECTED
SODIUM SERPL-SCNC: 140 MMOL/L
WBC # FLD AUTO: 4.95 K/UL

## 2022-06-07 ENCOUNTER — LABORATORY RESULT (OUTPATIENT)
Age: 57
End: 2022-06-07

## 2022-06-08 ENCOUNTER — APPOINTMENT (OUTPATIENT)
Dept: ENDOVASCULAR SURGERY | Facility: CLINIC | Age: 57
End: 2022-06-08
Payer: MEDICAID

## 2022-06-08 VITALS
BODY MASS INDEX: 37.11 KG/M2 | RESPIRATION RATE: 18 BRPM | WEIGHT: 280 LBS | DIASTOLIC BLOOD PRESSURE: 89 MMHG | HEIGHT: 73 IN | TEMPERATURE: 98.6 F | OXYGEN SATURATION: 96 % | SYSTOLIC BLOOD PRESSURE: 149 MMHG | HEART RATE: 85 BPM

## 2022-06-08 DIAGNOSIS — I83.93 ASYMPTOMATIC VARICOSE VEINS OF BILATERAL LOWER EXTREMITIES: ICD-10-CM

## 2022-06-08 PROCEDURE — 36475 ENDOVENOUS RF 1ST VEIN: CPT | Mod: RT

## 2022-06-08 PROCEDURE — 37765 STAB PHLEB VEINS XTR 10-20: CPT | Mod: RT

## 2022-06-08 NOTE — PROCEDURE
[D/C IV on discharge] : D/C IV on discharge [Resume diet] : resume diet [Dressing checked for bleeding] : Dressing checked for bleeding [Vital signs on admission the q 15 mins x2] : Vital signs on admission the q 15 mins x2 [FreeTextEntry1] : right leg radiofrequency ablation and phlebectomy

## 2022-06-08 NOTE — PAST MEDICAL HISTORY
[Increasing age ( >40 years old)] : Increasing age ( >40 years old) [Varicose Veins] : Varicose Veins [No therapy indicated for cases scheduled for less than one hour] : No therapy indicated for cases scheduled for less than one hour. [FreeTextEntry1] : \par \par Malignant Hyperthermis (MH) Screening Tool and Risk of Bleeding Assessement\par Mr. AURELIA ALVARES  denies family history of unexpected death following Anesthesia or Exercise.\par Denies Family history of Malignant Hyperthermia, Muscle or Neuromuscular disorder and High Temperature following exercise.\par \par Mr. AURELIA ALVARES denies history of Muscle Spasm, Dark or Chocolate - Colored urine and Unanticipated fever immediately following anesthesia or serious exercise. \par Mr. ALVARES  also denies bleeding tendencies/ Risks of Bleeding\par

## 2022-06-13 ENCOUNTER — APPOINTMENT (OUTPATIENT)
Dept: VASCULAR SURGERY | Facility: CLINIC | Age: 57
End: 2022-06-13
Payer: MEDICAID

## 2022-06-13 PROCEDURE — 93971 EXTREMITY STUDY: CPT

## 2022-06-13 PROCEDURE — 99024 POSTOP FOLLOW-UP VISIT: CPT

## 2022-06-13 NOTE — REASON FOR VISIT
[de-identified] : right gsv ablation and stab phlebectomy  [de-identified] : pt doing well without any complaints \par pt states that pain only when he walks \par denies any cp/sob or discharge from the wounds \par pt has been wearing compression stockings with some relief of his pain

## 2022-06-13 NOTE — DISCUSSION/SUMMARY
[FreeTextEntry1] : pt doing well duplex shows ablated gsv without any evidence of dvt \par \par pt to continue with compression stockings and elevation and motrin as needed for pain \par pt to follow up in 1 month

## 2022-07-18 ENCOUNTER — APPOINTMENT (OUTPATIENT)
Dept: VASCULAR SURGERY | Facility: CLINIC | Age: 57
End: 2022-07-18

## 2022-08-05 ENCOUNTER — APPOINTMENT (OUTPATIENT)
Dept: COLORECTAL SURGERY | Facility: CLINIC | Age: 57
End: 2022-08-05

## 2022-08-05 VITALS
WEIGHT: 285 LBS | SYSTOLIC BLOOD PRESSURE: 140 MMHG | HEIGHT: 72 IN | TEMPERATURE: 98.3 F | DIASTOLIC BLOOD PRESSURE: 90 MMHG | BODY MASS INDEX: 38.6 KG/M2 | HEART RATE: 80 BPM | RESPIRATION RATE: 14 BRPM

## 2022-08-05 DIAGNOSIS — R19.09 OTHER INTRA-ABDOMINAL AND PELVIC SWELLING, MASS AND LUMP: ICD-10-CM

## 2022-08-05 PROCEDURE — 99203 OFFICE O/P NEW LOW 30 MIN: CPT

## 2022-08-05 RX ORDER — FAMOTIDINE 40 MG/1
40 TABLET, FILM COATED ORAL
Qty: 30 | Refills: 0 | Status: ACTIVE | COMMUNITY
Start: 2022-05-23

## 2022-08-05 RX ORDER — OMEPRAZOLE 40 MG/1
40 CAPSULE, DELAYED RELEASE ORAL
Qty: 30 | Refills: 0 | Status: DISCONTINUED | COMMUNITY
Start: 2022-03-14

## 2022-08-05 RX ORDER — PANTOPRAZOLE 40 MG/1
40 TABLET, DELAYED RELEASE ORAL
Qty: 30 | Refills: 0 | Status: ACTIVE | COMMUNITY
Start: 2022-05-23

## 2022-08-11 ENCOUNTER — APPOINTMENT (OUTPATIENT)
Dept: SURGERY | Facility: CLINIC | Age: 57
End: 2022-08-11

## 2022-08-11 PROCEDURE — 99214 OFFICE O/P EST MOD 30 MIN: CPT

## 2022-08-15 NOTE — HISTORY OF PRESENT ILLNESS
[FreeTextEntry1] : 55yo Chinese male with intermittent RLQ abdominal pain. Initial screening colonoscopy in 2021 (benign polyps retrieved). Advised to f/u in 5yrs.\par \par Daily BM, rare BRB on wiping/toilet bowl.\par \par Due to persistent discomfort GI ordered CT A&P in June 2022. Scan revealed rt perirectal/presacral mass. Advised to f/u with colorectal surgeon.

## 2022-08-15 NOTE — REASON FOR VISIT
[Consultation] : a consultation visit [Other: _____] : [unfilled] [FreeTextEntry1] : pt intake forms reviewed

## 2022-08-15 NOTE — ASSESSMENT
[FreeTextEntry1] : 56-year-old Chinese male who presents today for consultation regarding management of a newly diagnosed presacral cystic lesion.\par \par Over the past few years the patient has experienced vague right lower abdominal pain.  This prompted the performance of a colonoscopy in 2021 which was unremarkable.  Due to persistence of this symptomatology a CAT scan was ordered and performed in June 2022.  This revealed a sizable cystic presacral lesion.\par \par I have reviewed the films myself.  The lesion is approximately 8 cm in maximal diameter and is in the right presacral space extending down to the pelvic floor.  There is a compressive effect on the rectum.\par \par Lesions in this anatomic location are very variegated and have numerous etiologies.  Most commonly they are benign and cystic.  There is the potential for enlargement, infection, rupture and an extremely rare case of malignancy.  For all these reasons, I still believe this needs to be excised.\par \par Due to the patient's pannus and his physique I would advocate attempting this robotically.  If this cannot be accomplished in that fashion, we would have to proceed with open approach.  I do not believe rectal resection would be required.\par \par The patient will see my associate who is an expert in robotic surgery and I will be available if open approach is needed.

## 2022-08-15 NOTE — REVIEW OF SYSTEMS
[SOB on Exertion] : shortness of breath during exertion [As Noted in HPI] : as noted in HPI [Negative] : Endocrine [Chest Pain] : no chest pain [Shortness Of Breath] : no shortness of breath [Easy Bleeding] : no tendency for easy bleeding [Easy Bruising] : no tendency for easy bruising [FreeTextEntry5] : 2020/21 ST/echo

## 2022-08-15 NOTE — PHYSICAL EXAM
[Normal Breath Sounds] : Normal breath sounds [Normal Rate and Rhythm] : normal rate and rhythm [No Edema] : No edema [Alert] : alert [Oriented to Person] : oriented to person [Oriented to Place] : oriented to place [Oriented to Time] : oriented to time [Calm] : calm [de-identified] : obese soft +BS NT/ND [de-identified] : NC/AT [de-identified] : +ROM [de-identified] : intact

## 2022-08-16 RX ORDER — METRONIDAZOLE 250 MG/1
250 TABLET ORAL
Qty: 3 | Refills: 0 | Status: ACTIVE | COMMUNITY
Start: 2022-08-16 | End: 1900-01-01

## 2022-08-16 RX ORDER — NEOMYCIN SULFATE 500 MG/1
500 TABLET ORAL
Qty: 3 | Refills: 0 | Status: ACTIVE | COMMUNITY
Start: 2022-08-16 | End: 1900-01-01

## 2022-08-16 NOTE — ASSESSMENT
[FreeTextEntry1] : 56y.o. 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

## 2022-08-16 NOTE — HISTORY OF PRESENT ILLNESS
[FreeTextEntry1] : Mr. Humaira Villareal is a 55yo Mandarin Chinese speaking Male (son translated) with presacral mass referred for surgical management. He was having intermittent RLQ and pelvic abdominal pain which led to imaging ordered by his GI in June which demonstrated a right presacral mass w/ some compression on his rectum. He had his first Initial screening colonoscopy in 2021 (benign polyps retrieved) and advised to f/u in 5yrs per pt and son.\par \par Daily BM, rare BRB on wiping/toilet bowl and reports some change in caliber and increasing issues with constipation.

## 2022-08-16 NOTE — PHYSICAL EXAM
[Exam Deferred] : exam was deferred [JVD] : no jugular venous distention  [Normal Rate and Rhythm] : normal rate and rhythm [No Edema] : No edema [Alert] : alert [Oriented to Person] : oriented to person [Oriented to Place] : oriented to place [Oriented to Time] : oriented to time [de-identified] : soft, obese, non-distended, non-tender to palpation, no incisions/scars [de-identified] : awake, alert, in NAD [de-identified] : normocephalic, atraumatic, EOMI, nl conjunctiva [de-identified] : b/l chest rise, EWOB on RA [de-identified] : deferred [de-identified] : normal strength [de-identified] : no visible scars [de-identified] : normal mood and affect

## 2022-08-18 ENCOUNTER — OUTPATIENT (OUTPATIENT)
Dept: OUTPATIENT SERVICES | Facility: HOSPITAL | Age: 57
LOS: 1 days | End: 2022-08-18
Payer: MEDICAID

## 2022-08-18 VITALS
SYSTOLIC BLOOD PRESSURE: 143 MMHG | RESPIRATION RATE: 18 BRPM | HEIGHT: 73 IN | OXYGEN SATURATION: 96 % | WEIGHT: 270.95 LBS | TEMPERATURE: 98 F | HEART RATE: 93 BPM | DIASTOLIC BLOOD PRESSURE: 98 MMHG

## 2022-08-18 DIAGNOSIS — R19.09 OTHER INTRA-ABDOMINAL AND PELVIC SWELLING, MASS AND LUMP: ICD-10-CM

## 2022-08-18 DIAGNOSIS — B19.10 UNSPECIFIED VIRAL HEPATITIS B WITHOUT HEPATIC COMA: ICD-10-CM

## 2022-08-18 DIAGNOSIS — K21.9 GASTRO-ESOPHAGEAL REFLUX DISEASE WITHOUT ESOPHAGITIS: ICD-10-CM

## 2022-08-18 DIAGNOSIS — Z98.890 OTHER SPECIFIED POSTPROCEDURAL STATES: Chronic | ICD-10-CM

## 2022-08-18 DIAGNOSIS — E89.2 POSTPROCEDURAL HYPOPARATHYROIDISM: Chronic | ICD-10-CM

## 2022-08-18 DIAGNOSIS — Z29.9 ENCOUNTER FOR PROPHYLACTIC MEASURES, UNSPECIFIED: ICD-10-CM

## 2022-08-18 DIAGNOSIS — Z01.818 ENCOUNTER FOR OTHER PREPROCEDURAL EXAMINATION: ICD-10-CM

## 2022-08-18 LAB
A1C WITH ESTIMATED AVERAGE GLUCOSE RESULT: 5.9 % — HIGH (ref 4–5.6)
ALBUMIN SERPL ELPH-MCNC: 5 G/DL — SIGNIFICANT CHANGE UP (ref 3.3–5)
ALP SERPL-CCNC: 57 U/L — SIGNIFICANT CHANGE UP (ref 40–120)
ALT FLD-CCNC: 13 U/L — SIGNIFICANT CHANGE UP (ref 10–45)
ANION GAP SERPL CALC-SCNC: 12 MMOL/L — SIGNIFICANT CHANGE UP (ref 5–17)
AST SERPL-CCNC: 15 U/L — SIGNIFICANT CHANGE UP (ref 10–40)
BILIRUB SERPL-MCNC: 0.7 MG/DL — SIGNIFICANT CHANGE UP (ref 0.2–1.2)
BLD GP AB SCN SERPL QL: NEGATIVE — SIGNIFICANT CHANGE UP
BUN SERPL-MCNC: 16 MG/DL — SIGNIFICANT CHANGE UP (ref 7–23)
CALCIUM SERPL-MCNC: 10 MG/DL — SIGNIFICANT CHANGE UP (ref 8.4–10.5)
CEA SERPL-MCNC: 0.8 NG/ML — SIGNIFICANT CHANGE UP (ref 0–3.8)
CHLORIDE SERPL-SCNC: 103 MMOL/L — SIGNIFICANT CHANGE UP (ref 96–108)
CO2 SERPL-SCNC: 22 MMOL/L — SIGNIFICANT CHANGE UP (ref 22–31)
CREAT SERPL-MCNC: 0.8 MG/DL — SIGNIFICANT CHANGE UP (ref 0.5–1.3)
EGFR: 102 ML/MIN/1.73M2 — SIGNIFICANT CHANGE UP
ESTIMATED AVERAGE GLUCOSE: 123 MG/DL — HIGH (ref 68–114)
GLUCOSE SERPL-MCNC: 103 MG/DL — HIGH (ref 70–99)
HCT VFR BLD CALC: 44.8 % — SIGNIFICANT CHANGE UP (ref 39–50)
HGB BLD-MCNC: 15 G/DL — SIGNIFICANT CHANGE UP (ref 13–17)
MCHC RBC-ENTMCNC: 31.6 PG — SIGNIFICANT CHANGE UP (ref 27–34)
MCHC RBC-ENTMCNC: 33.5 GM/DL — SIGNIFICANT CHANGE UP (ref 32–36)
MCV RBC AUTO: 94.3 FL — SIGNIFICANT CHANGE UP (ref 80–100)
NRBC # BLD: 0 /100 WBCS — SIGNIFICANT CHANGE UP (ref 0–0)
PLATELET # BLD AUTO: 153 K/UL — SIGNIFICANT CHANGE UP (ref 150–400)
POTASSIUM SERPL-MCNC: 4.1 MMOL/L — SIGNIFICANT CHANGE UP (ref 3.5–5.3)
POTASSIUM SERPL-SCNC: 4.1 MMOL/L — SIGNIFICANT CHANGE UP (ref 3.5–5.3)
PROT SERPL-MCNC: 7.9 G/DL — SIGNIFICANT CHANGE UP (ref 6–8.3)
RBC # BLD: 4.75 M/UL — SIGNIFICANT CHANGE UP (ref 4.2–5.8)
RBC # FLD: 12.2 % — SIGNIFICANT CHANGE UP (ref 10.3–14.5)
RH IG SCN BLD-IMP: POSITIVE — SIGNIFICANT CHANGE UP
SODIUM SERPL-SCNC: 137 MMOL/L — SIGNIFICANT CHANGE UP (ref 135–145)
WBC # BLD: 4.57 K/UL — SIGNIFICANT CHANGE UP (ref 3.8–10.5)
WBC # FLD AUTO: 4.57 K/UL — SIGNIFICANT CHANGE UP (ref 3.8–10.5)

## 2022-08-18 PROCEDURE — 86900 BLOOD TYPING SEROLOGIC ABO: CPT

## 2022-08-18 PROCEDURE — 36415 COLL VENOUS BLD VENIPUNCTURE: CPT

## 2022-08-18 PROCEDURE — 83036 HEMOGLOBIN GLYCOSYLATED A1C: CPT

## 2022-08-18 PROCEDURE — G0463: CPT

## 2022-08-18 PROCEDURE — 82378 CARCINOEMBRYONIC ANTIGEN: CPT

## 2022-08-18 PROCEDURE — 86901 BLOOD TYPING SEROLOGIC RH(D): CPT

## 2022-08-18 PROCEDURE — 85027 COMPLETE CBC AUTOMATED: CPT

## 2022-08-18 PROCEDURE — 80053 COMPREHEN METABOLIC PANEL: CPT

## 2022-08-18 PROCEDURE — 86850 RBC ANTIBODY SCREEN: CPT

## 2022-08-18 RX ORDER — CEFOTETAN DISODIUM 1 G
2 VIAL (EA) INJECTION ONCE
Refills: 0 | Status: DISCONTINUED | OUTPATIENT
Start: 2022-08-25 | End: 2022-08-25

## 2022-08-18 NOTE — H&P PST ADULT - FALL HARM RISK - UNIVERSAL INTERVENTIONS
Bed in lowest position, wheels locked, appropriate side rails in place/Call bell, personal items and telephone in reach/Instruct patient to call for assistance before getting out of bed or chair/Non-slip footwear when patient is out of bed/[place] to call system/Physically safe environment - no spills, clutter or unnecessary equipment/Purposeful Proactive Rounding/Room/bathroom lighting operational, light cord in reach

## 2022-08-18 NOTE — H&P PST ADULT - PROBLEM SELECTOR PLAN 1
ERP  robotic assisted pelvic mass excision  flexible sigmoid  possible low anterior resection  EKG and preoperative medical optimization were done by PMD, report to be obtained

## 2022-08-18 NOTE — H&P PST ADULT - NSICDXPASTMEDICALHX_GEN_ALL_CORE_FT
PAST MEDICAL HISTORY:  Benign lipomatous neoplasm of skin and subcutaneous tissue of head, face and neck     GERD (gastroesophageal reflux disease)     Hepatitis B on Vemlidy    Hyperparathyroidism, unspecified     Obesity      PAST MEDICAL HISTORY:  Benign lipomatous neoplasm of skin and subcutaneous tissue of head, face and neck     GERD (gastroesophageal reflux disease)     Hepatitis B on Vemlidy    Hyperparathyroidism, unspecified     Obesity BMI 35.7

## 2022-08-18 NOTE — H&P PST ADULT - NS ATTEND AMEND GEN_ALL_CORE FT
OR today for robotic (possible laparoscopic, possible open) pelvic mass excision, flex sig, possible bowel resection, possible ostomy  Lithotomy w/ both arms tucked  general and spinal  0.5% marcaine and exparel 1:1    Imelda Sam MD  Attending Physician

## 2022-08-18 NOTE — H&P PST ADULT - HISTORY OF PRESENT ILLNESS
This is a 56 y/o male c/o RLQ abdominal  and pelvic pain This is a 56 y/o male with PMH of hyperparathyroid, hepatitis B on vemlidy, GERD,  c/o RLQ abdominal  and pelvic pain , CT revealed presacral mass with some compression on the rectum, he presents today for ERP robotic assisted pelvic mass excision, flexible sigmoid, possible low anterior resection on 8/25/22  covid swab to be done 8/22 at [facility]  denies recent travel or covid infections  pt speaks Mandarin, translated by son, pt refused [id number]

## 2022-08-18 NOTE — H&P PST ADULT - ASSESSMENT
JONI VTE 2.0 SCORE [CLOT updated 2019]    AGE RELATED RISK FACTORS                                                       MOBILITY RELATED FACTORS  x[x ] Age 41-60 years                                            (1 Point)                    [ ] Bed rest                                                        (1 Point)  [ ] Age: 61-74 years                                           (2 Points)                  [ ] Plaster cast                                                   (2 Points)  [ ] Age= 75 years                                              (3 Points)                    [ ] Bed bound for more than 72 hours                 (2 Points)    DISEASE RELATED RISK FACTORS                                               GENDER SPECIFIC FACTORS  [ ] Edema in the lower extremities                       (1 Point)              [ ] Pregnancy                                                     (1 Point)  [ ] Varicose veins                                               (1 Point)                     [ ] Post-partum < 6 weeks                                   (1 Point)             [x ] BMI > 25 Kg/m2                                            (1 Point)                     [ ] Hormonal therapy  or oral contraception          (1 Point)                 [ ] Sepsis (in the previous month)                        (1 Point)               [ ] History of pregnancy complications                 (1 point)  [ ] Pneumonia or serious lung disease                                               [ ] Unexplained or recurrent                     (1 Point)           (in the previous month)                               (1 Point)  [ ] Abnormal pulmonary function test                     (1 Point)                 SURGERY RELATED RISK FACTORS  [ ] Acute myocardial infarction                              (1 Point)               [ ]  Section                                             (1 Point)  [ ] Congestive heart failure (in the previous month)  (1 Point)      [ ] Minor surgery                                                  (1 Point)   [ ] Inflammatory bowel disease                             (1 Point)               [ ] Arthroscopic surgery                                        (2 Points)  [ ] Central venous access                                      (2 Points)                [x ] General surgery lasting more than 45 minutes (2 points)  [ ] Malignancy- Present or previous                   (2 Points)                [ ] Elective arthroplasty                                         (5 points)    [ ] Stroke (in the previous month)                          (5 Points)                                                                                                                                                           HEMATOLOGY RELATED FACTORS                                                 TRAUMA RELATED RISK FACTORS  [ ] Prior episodes of VTE                                     (3 Points)                [ ] Fracture of the hip, pelvis, or leg                       (5 Points)  [ ] Positive family history for VTE                         (3 Points)             [ ] Acute spinal cord injury (in the previous month)  (5 Points)  [ ] Prothrombin 62022 A                                     (3 Points)               [ ] Paralysis  (less than 1 month)                             (5 Points)  [ ] Factor V Leiden                                             (3 Points)                  [ ] Multiple Trauma within 1 month                        (5 Points)  [ ] Lupus anticoagulants                                     (3 Points)                                                           [ ] Anticardiolipin antibodies                               (3 Points)                                                       [ ] High homocysteine in the blood                      (3 Points)                                             [ ] Other congenital or acquired thrombophilia      (3 Points)                                                [ ] Heparin induced thrombocytopenia                  (3 Points)                                     Total Score [     4     ]

## 2022-08-18 NOTE — H&P PST ADULT - NSICDXPASTSURGICALHX_GEN_ALL_CORE_FT
PAST SURGICAL HISTORY:  History of parathyroidectomy     History of varicose vein stripping laser

## 2022-08-18 NOTE — H&P PST ADULT - PROBLEM SELECTOR PROBLEM 2
Tetracycline Pregnancy And Lactation Text: This medication is Pregnancy Category D and not consider safe during pregnancy. It is also excreted in breast milk. GERD (gastroesophageal reflux disease)

## 2022-08-22 ENCOUNTER — OUTPATIENT (OUTPATIENT)
Dept: OUTPATIENT SERVICES | Facility: HOSPITAL | Age: 57
LOS: 1 days | End: 2022-08-22
Payer: MEDICAID

## 2022-08-22 DIAGNOSIS — E89.2 POSTPROCEDURAL HYPOPARATHYROIDISM: Chronic | ICD-10-CM

## 2022-08-22 DIAGNOSIS — Z11.52 ENCOUNTER FOR SCREENING FOR COVID-19: ICD-10-CM

## 2022-08-22 DIAGNOSIS — Z98.890 OTHER SPECIFIED POSTPROCEDURAL STATES: Chronic | ICD-10-CM

## 2022-08-22 LAB — SARS-COV-2 RNA SPEC QL NAA+PROBE: SIGNIFICANT CHANGE UP

## 2022-08-22 PROCEDURE — U0005: CPT

## 2022-08-22 PROCEDURE — C9803: CPT

## 2022-08-22 PROCEDURE — U0003: CPT

## 2022-08-24 ENCOUNTER — TRANSCRIPTION ENCOUNTER (OUTPATIENT)
Age: 57
End: 2022-08-24

## 2022-08-25 ENCOUNTER — INPATIENT (INPATIENT)
Facility: HOSPITAL | Age: 57
LOS: 4 days | Discharge: HOME | DRG: 357 | End: 2022-08-30
Attending: STUDENT IN AN ORGANIZED HEALTH CARE EDUCATION/TRAINING PROGRAM | Admitting: STUDENT IN AN ORGANIZED HEALTH CARE EDUCATION/TRAINING PROGRAM
Payer: MEDICAID

## 2022-08-25 ENCOUNTER — APPOINTMENT (OUTPATIENT)
Dept: SURGERY | Facility: HOSPITAL | Age: 57
End: 2022-08-25

## 2022-08-25 VITALS
HEIGHT: 73 IN | WEIGHT: 270.95 LBS | HEART RATE: 96 BPM | OXYGEN SATURATION: 97 % | TEMPERATURE: 98 F | RESPIRATION RATE: 18 BRPM | DIASTOLIC BLOOD PRESSURE: 96 MMHG | SYSTOLIC BLOOD PRESSURE: 138 MMHG

## 2022-08-25 DIAGNOSIS — Z11.52 ENCOUNTER FOR SCREENING FOR COVID-19: ICD-10-CM

## 2022-08-25 DIAGNOSIS — E89.2 POSTPROCEDURAL HYPOPARATHYROIDISM: Chronic | ICD-10-CM

## 2022-08-25 DIAGNOSIS — Z98.890 OTHER SPECIFIED POSTPROCEDURAL STATES: Chronic | ICD-10-CM

## 2022-08-25 DIAGNOSIS — R19.09 OTHER INTRA-ABDOMINAL AND PELVIC SWELLING, MASS AND LUMP: ICD-10-CM

## 2022-08-25 LAB
ANION GAP SERPL CALC-SCNC: 10 MMOL/L — SIGNIFICANT CHANGE UP (ref 5–17)
ANION GAP SERPL CALC-SCNC: 15 MMOL/L — SIGNIFICANT CHANGE UP (ref 5–17)
BUN SERPL-MCNC: 14 MG/DL — SIGNIFICANT CHANGE UP (ref 7–23)
BUN SERPL-MCNC: 16 MG/DL — SIGNIFICANT CHANGE UP (ref 7–23)
CALCIUM SERPL-MCNC: 9 MG/DL — SIGNIFICANT CHANGE UP (ref 8.4–10.5)
CALCIUM SERPL-MCNC: 9.3 MG/DL — SIGNIFICANT CHANGE UP (ref 8.4–10.5)
CHLORIDE SERPL-SCNC: 103 MMOL/L — SIGNIFICANT CHANGE UP (ref 96–108)
CHLORIDE SERPL-SCNC: 104 MMOL/L — SIGNIFICANT CHANGE UP (ref 96–108)
CK MB BLD-MCNC: 1.6 % — SIGNIFICANT CHANGE UP (ref 0–3.5)
CK MB CFR SERPL CALC: 2.2 NG/ML — SIGNIFICANT CHANGE UP (ref 0–6.7)
CK SERPL-CCNC: 138 U/L — SIGNIFICANT CHANGE UP (ref 30–200)
CO2 SERPL-SCNC: 20 MMOL/L — LOW (ref 22–31)
CO2 SERPL-SCNC: 23 MMOL/L — SIGNIFICANT CHANGE UP (ref 22–31)
CREAT SERPL-MCNC: 0.75 MG/DL — SIGNIFICANT CHANGE UP (ref 0.5–1.3)
CREAT SERPL-MCNC: 0.85 MG/DL — SIGNIFICANT CHANGE UP (ref 0.5–1.3)
EGFR: 100 ML/MIN/1.73M2 — SIGNIFICANT CHANGE UP
EGFR: 104 ML/MIN/1.73M2 — SIGNIFICANT CHANGE UP
GLUCOSE BLDC GLUCOMTR-MCNC: 113 MG/DL — HIGH (ref 70–99)
GLUCOSE SERPL-MCNC: 154 MG/DL — HIGH (ref 70–99)
GLUCOSE SERPL-MCNC: 176 MG/DL — HIGH (ref 70–99)
HCT VFR BLD CALC: 41.1 % — SIGNIFICANT CHANGE UP (ref 39–50)
HCT VFR BLD CALC: 43.1 % — SIGNIFICANT CHANGE UP (ref 39–50)
HGB BLD-MCNC: 14 G/DL — SIGNIFICANT CHANGE UP (ref 13–17)
HGB BLD-MCNC: 14.4 G/DL — SIGNIFICANT CHANGE UP (ref 13–17)
MAGNESIUM SERPL-MCNC: 1.9 MG/DL — SIGNIFICANT CHANGE UP (ref 1.6–2.6)
MAGNESIUM SERPL-MCNC: 2 MG/DL — SIGNIFICANT CHANGE UP (ref 1.6–2.6)
MCHC RBC-ENTMCNC: 31.9 PG — SIGNIFICANT CHANGE UP (ref 27–34)
MCHC RBC-ENTMCNC: 32.2 PG — SIGNIFICANT CHANGE UP (ref 27–34)
MCHC RBC-ENTMCNC: 33.4 GM/DL — SIGNIFICANT CHANGE UP (ref 32–36)
MCHC RBC-ENTMCNC: 34.1 GM/DL — SIGNIFICANT CHANGE UP (ref 32–36)
MCV RBC AUTO: 94.5 FL — SIGNIFICANT CHANGE UP (ref 80–100)
MCV RBC AUTO: 95.6 FL — SIGNIFICANT CHANGE UP (ref 80–100)
NRBC # BLD: 0 /100 WBCS — SIGNIFICANT CHANGE UP (ref 0–0)
NRBC # BLD: 0 /100 WBCS — SIGNIFICANT CHANGE UP (ref 0–0)
PHOSPHATE SERPL-MCNC: 3.1 MG/DL — SIGNIFICANT CHANGE UP (ref 2.5–4.5)
PHOSPHATE SERPL-MCNC: 3.5 MG/DL — SIGNIFICANT CHANGE UP (ref 2.5–4.5)
PLATELET # BLD AUTO: 151 K/UL — SIGNIFICANT CHANGE UP (ref 150–400)
PLATELET # BLD AUTO: 153 K/UL — SIGNIFICANT CHANGE UP (ref 150–400)
POTASSIUM SERPL-MCNC: 3.9 MMOL/L — SIGNIFICANT CHANGE UP (ref 3.5–5.3)
POTASSIUM SERPL-MCNC: 4.2 MMOL/L — SIGNIFICANT CHANGE UP (ref 3.5–5.3)
POTASSIUM SERPL-SCNC: 3.9 MMOL/L — SIGNIFICANT CHANGE UP (ref 3.5–5.3)
POTASSIUM SERPL-SCNC: 4.2 MMOL/L — SIGNIFICANT CHANGE UP (ref 3.5–5.3)
RBC # BLD: 4.35 M/UL — SIGNIFICANT CHANGE UP (ref 4.2–5.8)
RBC # BLD: 4.51 M/UL — SIGNIFICANT CHANGE UP (ref 4.2–5.8)
RBC # FLD: 12.3 % — SIGNIFICANT CHANGE UP (ref 10.3–14.5)
RBC # FLD: 12.5 % — SIGNIFICANT CHANGE UP (ref 10.3–14.5)
RH IG SCN BLD-IMP: POSITIVE — SIGNIFICANT CHANGE UP
SARS-COV-2 RNA SPEC QL NAA+PROBE: SIGNIFICANT CHANGE UP
SODIUM SERPL-SCNC: 137 MMOL/L — SIGNIFICANT CHANGE UP (ref 135–145)
SODIUM SERPL-SCNC: 138 MMOL/L — SIGNIFICANT CHANGE UP (ref 135–145)
TROPONIN T, HIGH SENSITIVITY RESULT: <6 NG/L — SIGNIFICANT CHANGE UP (ref 0–51)
WBC # BLD: 11.12 K/UL — HIGH (ref 3.8–10.5)
WBC # BLD: 11.68 K/UL — HIGH (ref 3.8–10.5)
WBC # FLD AUTO: 11.12 K/UL — HIGH (ref 3.8–10.5)
WBC # FLD AUTO: 11.68 K/UL — HIGH (ref 3.8–10.5)

## 2022-08-25 PROCEDURE — 93010 ELECTROCARDIOGRAM REPORT: CPT

## 2022-08-25 PROCEDURE — 45499 LAPAROSCOPE PROC RECTUM: CPT

## 2022-08-25 DEVICE — SEALANT TISSEEL VH SD 4ML: Type: IMPLANTABLE DEVICE | Status: FUNCTIONAL

## 2022-08-25 RX ORDER — SODIUM CHLORIDE 9 MG/ML
3 INJECTION INTRAMUSCULAR; INTRAVENOUS; SUBCUTANEOUS EVERY 8 HOURS
Refills: 0 | Status: DISCONTINUED | OUTPATIENT
Start: 2022-08-25 | End: 2022-08-25

## 2022-08-25 RX ORDER — FAMOTIDINE 10 MG/ML
20 INJECTION INTRAVENOUS ONCE
Refills: 0 | Status: COMPLETED | OUTPATIENT
Start: 2022-08-25 | End: 2022-08-25

## 2022-08-25 RX ORDER — ACETAMINOPHEN 500 MG
1000 TABLET ORAL EVERY 6 HOURS
Refills: 0 | Status: COMPLETED | OUTPATIENT
Start: 2022-08-25 | End: 2022-08-26

## 2022-08-25 RX ORDER — METOPROLOL TARTRATE 50 MG
5 TABLET ORAL ONCE
Refills: 0 | Status: COMPLETED | OUTPATIENT
Start: 2022-08-25 | End: 2022-08-25

## 2022-08-25 RX ORDER — NALOXONE HYDROCHLORIDE 4 MG/.1ML
0.1 SPRAY NASAL
Refills: 0 | Status: DISCONTINUED | OUTPATIENT
Start: 2022-08-25 | End: 2022-08-26

## 2022-08-25 RX ORDER — METOPROLOL TARTRATE 50 MG
2.5 TABLET ORAL ONCE
Refills: 0 | Status: COMPLETED | OUTPATIENT
Start: 2022-08-25 | End: 2022-08-25

## 2022-08-25 RX ORDER — FAMOTIDINE 10 MG/ML
40 INJECTION INTRAVENOUS DAILY
Refills: 0 | Status: DISCONTINUED | OUTPATIENT
Start: 2022-08-25 | End: 2022-08-30

## 2022-08-25 RX ORDER — PANTOPRAZOLE SODIUM 20 MG/1
40 TABLET, DELAYED RELEASE ORAL
Refills: 0 | Status: DISCONTINUED | OUTPATIENT
Start: 2022-08-25 | End: 2022-08-30

## 2022-08-25 RX ORDER — NITROGLYCERIN 6.5 MG
0.4 CAPSULE, EXTENDED RELEASE ORAL ONCE
Refills: 0 | Status: COMPLETED | OUTPATIENT
Start: 2022-08-25 | End: 2022-08-25

## 2022-08-25 RX ORDER — ONDANSETRON 8 MG/1
4 TABLET, FILM COATED ORAL EVERY 6 HOURS
Refills: 0 | Status: DISCONTINUED | OUTPATIENT
Start: 2022-08-25 | End: 2022-08-26

## 2022-08-25 RX ORDER — LIDOCAINE HCL 20 MG/ML
0.2 VIAL (ML) INJECTION ONCE
Refills: 0 | Status: DISCONTINUED | OUTPATIENT
Start: 2022-08-25 | End: 2022-08-25

## 2022-08-25 RX ORDER — KETOROLAC TROMETHAMINE 30 MG/ML
15 SYRINGE (ML) INJECTION EVERY 6 HOURS
Refills: 0 | Status: DISCONTINUED | OUTPATIENT
Start: 2022-08-25 | End: 2022-08-26

## 2022-08-25 RX ORDER — OXYCODONE HYDROCHLORIDE 5 MG/1
2.5 TABLET ORAL EVERY 4 HOURS
Refills: 0 | Status: DISCONTINUED | OUTPATIENT
Start: 2022-08-25 | End: 2022-08-30

## 2022-08-25 RX ORDER — MORPHINE SULFATE 50 MG/1
0.1 CAPSULE, EXTENDED RELEASE ORAL ONCE
Refills: 0 | Status: DISCONTINUED | OUTPATIENT
Start: 2022-08-25 | End: 2022-08-26

## 2022-08-25 RX ORDER — SIMETHICONE 80 MG/1
80 TABLET, CHEWABLE ORAL ONCE
Refills: 0 | Status: COMPLETED | OUTPATIENT
Start: 2022-08-25 | End: 2022-08-25

## 2022-08-25 RX ORDER — ASPIRIN/CALCIUM CARB/MAGNESIUM 324 MG
325 TABLET ORAL DAILY
Refills: 0 | Status: DISCONTINUED | OUTPATIENT
Start: 2022-08-25 | End: 2022-08-25

## 2022-08-25 RX ORDER — CHLORHEXIDINE GLUCONATE 213 G/1000ML
1 SOLUTION TOPICAL ONCE
Refills: 0 | Status: DISCONTINUED | OUTPATIENT
Start: 2022-08-25 | End: 2022-08-25

## 2022-08-25 RX ORDER — SODIUM CHLORIDE 9 MG/ML
1000 INJECTION, SOLUTION INTRAVENOUS
Refills: 0 | Status: DISCONTINUED | OUTPATIENT
Start: 2022-08-25 | End: 2022-08-26

## 2022-08-25 RX ORDER — OXYCODONE HYDROCHLORIDE 5 MG/1
5 TABLET ORAL EVERY 4 HOURS
Refills: 0 | Status: DISCONTINUED | OUTPATIENT
Start: 2022-08-25 | End: 2022-08-30

## 2022-08-25 RX ORDER — TENOFOVIR DISOPROXIL FUMARATE 300 MG/1
25 TABLET, FILM COATED ORAL DAILY
Refills: 0 | Status: DISCONTINUED | OUTPATIENT
Start: 2022-08-25 | End: 2022-08-30

## 2022-08-25 RX ORDER — GABAPENTIN 400 MG/1
600 CAPSULE ORAL ONCE
Refills: 0 | Status: COMPLETED | OUTPATIENT
Start: 2022-08-25 | End: 2022-08-25

## 2022-08-25 RX ORDER — METOPROLOL TARTRATE 50 MG
5 TABLET ORAL ONCE
Refills: 0 | Status: DISCONTINUED | OUTPATIENT
Start: 2022-08-25 | End: 2022-08-25

## 2022-08-25 RX ORDER — CLOPIDOGREL BISULFATE 75 MG/1
600 TABLET, FILM COATED ORAL ONCE
Refills: 0 | Status: COMPLETED | OUTPATIENT
Start: 2022-08-25 | End: 2022-08-26

## 2022-08-25 RX ORDER — ASPIRIN/CALCIUM CARB/MAGNESIUM 324 MG
325 TABLET ORAL ONCE
Refills: 0 | Status: COMPLETED | OUTPATIENT
Start: 2022-08-25 | End: 2022-08-25

## 2022-08-25 RX ORDER — ENOXAPARIN SODIUM 100 MG/ML
40 INJECTION SUBCUTANEOUS EVERY 24 HOURS
Refills: 0 | Status: DISCONTINUED | OUTPATIENT
Start: 2022-08-25 | End: 2022-08-30

## 2022-08-25 RX ADMIN — SIMETHICONE 80 MILLIGRAM(S): 80 TABLET, CHEWABLE ORAL at 16:56

## 2022-08-25 RX ADMIN — Medication 5 MILLIGRAM(S): at 17:21

## 2022-08-25 RX ADMIN — SODIUM CHLORIDE 40 MILLILITER(S): 9 INJECTION, SOLUTION INTRAVENOUS at 20:56

## 2022-08-25 RX ADMIN — GABAPENTIN 600 MILLIGRAM(S): 400 CAPSULE ORAL at 07:10

## 2022-08-25 RX ADMIN — Medication 2.5 MILLIGRAM(S): at 16:38

## 2022-08-25 RX ADMIN — Medication 5 MILLIGRAM(S): at 16:56

## 2022-08-25 RX ADMIN — Medication 2.5 MILLIGRAM(S): at 16:45

## 2022-08-25 RX ADMIN — Medication 15 MILLIGRAM(S): at 22:59

## 2022-08-25 RX ADMIN — Medication 400 MILLIGRAM(S): at 20:45

## 2022-08-25 RX ADMIN — ONDANSETRON 4 MILLIGRAM(S): 8 TABLET, FILM COATED ORAL at 16:15

## 2022-08-25 RX ADMIN — FAMOTIDINE 20 MILLIGRAM(S): 10 INJECTION INTRAVENOUS at 16:20

## 2022-08-25 RX ADMIN — Medication 0.4 MILLIGRAM(S): at 16:50

## 2022-08-25 RX ADMIN — Medication 1000 MILLIGRAM(S): at 21:00

## 2022-08-25 RX ADMIN — Medication 325 MILLIGRAM(S): at 16:56

## 2022-08-25 RX ADMIN — TENOFOVIR DISOPROXIL FUMARATE 25 MILLIGRAM(S): 300 TABLET, FILM COATED ORAL at 22:58

## 2022-08-25 NOTE — CHART NOTE - NSCHARTNOTEFT_GEN_A_CORE
Called by PACU anesthesia, patient tachycardic and complaining of chest pain, EKG showing T wave inversion in V4 and V5; 10 mg of metoprolol and sublingual nitroglycerin had been given . Patient seen and examined at bedside, patient at the moment with VSS, bp 129/80, HR 99, saturating 98. , stat labs and cardiac enzymes ordered and house cardiology were called immediately. Patient with improved symptoms after medication, CBC and BMP w/o abnormalities. Surgical Senior Santiaog at bedside assessing the patient too. Cardiology fellow assessed patient and evaluated labs and EKG. Fellow will discuss with attending and give recs to surgery team soon.       red team surgery

## 2022-08-25 NOTE — CONSULT NOTE ADULT - ATTENDING COMMENTS
Unclear cardiac history; denies any h/o CAD/MI. After surgery, noted chest pressure/shortness of breath. EKG with ST/T wave abnormalities. Symptoms / EKG improved. R/o for MI. Hemodyn stable. Suggest asa, statin, bblocker, echo, stress test.  assisted in explaining plan to patient.

## 2022-08-25 NOTE — CONSULT NOTE ADULT - SUBJECTIVE AND OBJECTIVE BOX
Patient seen and evaluated at bedside    Reason for consult: Chest pain with EKG changes     HPI:    The patient is a  58 y/o male with PMH of hyperparathyroidoism, hepatitis B on vemlidy, and GERD who was found to have  a presacral mass with rectal compression and underwent a robotic laproscopic  pelvic mass excision on 8/25/22.  Cardiology was consulted as the patient began complaining of chest pain ~1615, "describing it as a rock sitting on his chest." An EKG was obtained which showed new t-wave inversions compared to prior EKG. The patient was given nitroglycerin, an Aspirin load and Metoprolol and cardiology was called. At bedside, the patient reports continued pain but states this is improving. His son denies any cardiac history.       PMHx:   Hepatitis B    Hyperparathyroidism, unspecified    Benign lipomatous neoplasm of skin and subcutaneous tissue of head, face and neck    Obesity    GERD (gastroesophageal reflux disease)        PSHx:   H/O: liver disease    No significant past surgical history    History of parathyroidectomy    History of varicose vein stripping        Home Meds:    Current Meds:   acetaminophen   IVPB .. 1000 milliGRAM(s) IV Intermittent every 6 hours  enoxaparin Injectable 40 milliGRAM(s) SubCutaneous every 24 hours  famotidine    Tablet 40 milliGRAM(s) Oral daily  ketorolac   Injectable 15 milliGRAM(s) IV Push every 6 hours  lactated ringers. 1000 milliLiter(s) IV Continuous <Continuous>  morphine PF Spinal 0.1 milliGRAM(s) IntraThecal. once  naloxone Injectable 0.1 milliGRAM(s) IV Push every 3 minutes PRN  ondansetron Injectable 4 milliGRAM(s) IV Push every 6 hours PRN  oxyCODONE    IR 2.5 milliGRAM(s) Oral every 4 hours PRN  oxyCODONE    IR 5 milliGRAM(s) Oral every 4 hours PRN  pantoprazole    Tablet 40 milliGRAM(s) Oral before breakfast  tenofovir alafenamide (VEMLIDY) 25 milliGRAM(s) Oral daily      Allergies:  sulfonamides (Unknown)      FAMILY HISTORY:  FH: lung cancer (Mother)        Review of Systems:  REVIEW OF SYSTEMS:  CONSTITUTIONAL: No weakness, fevers or chills  EYES/ENT: No visual changes;  No dysphagia  NECK: No pain or stiffness  RESPIRATORY: No cough, wheezing, hemoptysis; + shortness of breath  CARDIOVASCULAR: + chest pain. No lower extremity edema  GASTROINTESTINAL: No abdominal or epigastric pain. No nausea, vomiting, or hematemesis; No diarrhea or constipation. No melena or hematochezia.  BACK: No back pain  GENITOURINARY: No dysuria, frequency or hematuria  NEUROLOGICAL: No numbness or weakness  SKIN: No itching, burning, rashes, or lesions   All other review of systems is negative unless indicated above.    Physical Exam:  T(F): 97.3 (08-25), Max: 98.1 (08-25)  HR: 84 (08-25) (82 - 114)  BP: 129/82 (08-25) (114/81 - 138/96)  RR: 16 (08-25)  SpO2: 100% (08-25)    GENERAL: No acute distress, well-developed  HEAD:  Atraumatic, Normocephalic  ENT: conjunctiva and sclera clear, Neck supple, No JVD, moist mucosa  CHEST/LUNG: Clear to auscultation bilaterally; No wheeze, equal breath sounds bilaterally   BACK: No spinal tenderness  HEART: Regular rate and rhythm; No murmurs, rubs, or gallops  ABDOMEN: Soft, Nontender, Nondistended; Bowel sounds present  EXTREMITIES:  No clubbing, cyanosis, or edema  PSYCH: Nl behavior, nl affect  NEUROLOGY: No facial asymmetry  SKIN: Normal color, No rashes or lesions of exposed skin       Cardiovascular Diagnostic Testing:    ECG: Personally reviewed    Echo: NA    Labs: Personally reviewed                        14.4   11.68 )-----------( 151      ( 25 Aug 2022 15:52 )             43.1     08-25    138  |  103  |  16  ----------------------------<  176<H>  3.9   |  20<L>  |  0.85    Ca    9.3      25 Aug 2022 15:52  Phos  3.5     08-25  Mg     1.9     08-25    Troponin <6

## 2022-08-25 NOTE — PRE-ANESTHESIA EVALUATION ADULT - NSANTHPMHFT_GEN_ALL_CORE
chart and cv testing reviewed. denies cv/pulm dz - good et, no cp/sob. tte, stress essentially unremarkable

## 2022-08-25 NOTE — PATIENT PROFILE ADULT - FALL HARM RISK - UNIVERSAL INTERVENTIONS
Bed in lowest position, wheels locked, appropriate side rails in place/Call bell, personal items and telephone in reach/Instruct patient to call for assistance before getting out of bed or chair/Non-slip footwear when patient is out of bed/[place] to call system/Physically safe environment - no spills, clutter or unnecessary equipment/Purposeful Proactive Rounding/Room/bathroom lighting operational, light cord in reach

## 2022-08-25 NOTE — CHART NOTE - NSCHARTNOTEFT_GEN_A_CORE
Called to bedside pt c/o severe chest pressure. Patient with history of Hepatitis B and GERD. Via  patient stated that pain not reproducible, similar to GERD feeling he has had.   Given Simethicone 80 and Pepcid 20 IVP with no change in pressure. 12 lead EKG ordered. There appeared to be flipped T waves in anterio lateral leads( V3-V6. Given metoprolol 2.5 times 2. D/W Dr Meade, called Red team for who came to bedside. Surgery is amenable to aspirin. Baby Asprin 324 given.  Given 0.4mgSublingual nitroglycerine. On 4L NC HR 96  after total of 5 mg. Given additional 5mg IVP of metoprolol and High sensitivity troponin ordered. Cardiology @ bedside @1700. HR 90  given another 5mg IVP of metoprolol. Awaiting results of troponin. Called to bedside pt c/o severe chest pressure. Patient with history of Hepatitis B and GERD. Via  patient stated that pain not reproducible, similar to GERD feeling he has had.   Given Simethicone 80 and Pepcid 20 IVP with no change in pressure. 12 lead EKG ordered. There appeared to be flipped T waves in anterio lateral leads( V3-V6. Given metoprolol 2.5 times 2. D/W Dr Meade, called Red team for who came to bedside. Surgery is amenable to aspirin. Baby Asprin 324 given.  Given 0.4mgSublingual nitroglycerine. On 4L NC HR 96  after total of 5 mg. Given additional 5mg IVP of metoprolol and High sensitivity troponin ordered. Cardiology @ bedside @1700. HR 90  given another 5mg IVP of metoprolol. Hgb 14.4. Awaiting results of troponin.

## 2022-08-25 NOTE — CHART NOTE - NSCHARTNOTEFT_GEN_A_CORE
Surgery Post op Note    Procedure: robotic lap assist pelvic cyst excision    Subjective: Patient seen and evaluated at the bedside. Denies chest pain or SOB. No abd pain or nausea/vomit. No new complaints.       Objective:   Vital Signs Last 24 Hrs  T(C): 36.3 (25 Aug 2022 14:12), Max: 36.7 (25 Aug 2022 06:53)  T(F): 97.3 (25 Aug 2022 14:12), Max: 98.1 (25 Aug 2022 06:53)  HR: 82 (25 Aug 2022 20:00) (81 - 114)  BP: 131/88 (25 Aug 2022 20:00) (114/81 - 138/96)  BP(mean): 104 (25 Aug 2022 19:30) (89 - 106)  RR: 16 (25 Aug 2022 20:00) (16 - 18)  SpO2: 100% (25 Aug 2022 20:00) (92% - 100%)    Parameters below as of 25 Aug 2022 20:00  Patient On (Oxygen Delivery Method): nasal cannula  O2 Flow (L/min): 2    I&O's Summary    25 Aug 2022 07:01  -  25 Aug 2022 20:28  --------------------------------------------------------  IN: 290 mL / OUT: 495 mL / NET: -205 mL      I&O's Detail    25 Aug 2022 07:01  -  25 Aug 2022 20:28  --------------------------------------------------------  IN:    Lactated Ringers: 240 mL    Oral Fluid: 50 mL  Total IN: 290 mL    OUT:    Indwelling Catheter - Urethral (mL): 495 mL  Total OUT: 495 mL    Total NET: -205 mL            Labs:                        14.4   11.68 )-----------( 151      ( 25 Aug 2022 15:52 )             43.1     08-25    137  |  104  |  14  ----------------------------<  154<H>  4.2   |  23  |  0.75    Ca    9.0      25 Aug 2022 19:20  Phos  3.1     08-25  Mg     2.0     08-25            MEDICATIONS  (STANDING):  acetaminophen   IVPB .. 1000 milliGRAM(s) IV Intermittent every 6 hours  enoxaparin Injectable 40 milliGRAM(s) SubCutaneous every 24 hours  famotidine    Tablet 40 milliGRAM(s) Oral daily  ketorolac   Injectable 15 milliGRAM(s) IV Push every 6 hours  lactated ringers. 1000 milliLiter(s) (40 mL/Hr) IV Continuous <Continuous>  morphine PF Spinal 0.1 milliGRAM(s) IntraThecal. once  pantoprazole    Tablet 40 milliGRAM(s) Oral before breakfast  tenofovir alafenamide (VEMLIDY) 25 milliGRAM(s) Oral daily    MEDICATIONS  (PRN):  naloxone Injectable 0.1 milliGRAM(s) IV Push every 3 minutes PRN For ANY of the following changes in patient status:  A. RR LESS THAN 10 breaths per minute, B. Oxygen saturation LESS THAN 90%, C. Sedation score of 6  ondansetron Injectable 4 milliGRAM(s) IV Push every 6 hours PRN Nausea  oxyCODONE    IR 2.5 milliGRAM(s) Oral every 4 hours PRN Moderate Pain (4 - 6)  oxyCODONE    IR 5 milliGRAM(s) Oral every 4 hours PRN Severe Pain (7 - 10)        Physical Exam:  General: well developed, well nourished, NAD  HEENT: ncat, trachea midline  Respiratory: respirations non labored  Gastrointestinal: soft, nondistended, nontender. Port sites dry/clean.  Extremities: FROM, warm  Neurological: non-focal    Assessment/Plan:   - f/u cards about diagnostic cath  - plavix loading dose bolus?  - repeat COVID  - Diet: advance as tolerated  - Activity- OOB with assistance  - Labs: am labs  - Pain medication as needed   - DVT ppx  - Incentive spirometry

## 2022-08-25 NOTE — CONSULT NOTE ADULT - ASSESSMENT
The patient is a 56 y/o male with PMH of hyperparathyroidoism, hepatitis B on vemlidy, and GERD who is s/p robotic laproscopic  pelvic mass excision on 8/25/22 for a presacral mass. Cardiology was consulted due to post-procedural chest pain with EKG changes.     Chest pain   - The patient's chest pain does have some features concerning for unstable angina   - EKG with t-wave inversions in the anterior leads  - Troponin <6  - Pain described as a pressure and resolved with administration of Nitro, Metoprolol and Aspirin   - Troponin: Please trend Troponin to peak   - Please obtain a repeat EKG and cardiac biomarkers for any chest pain recurrence   - Given patient's high bleeding risk post-surgery and recent Aspirin load, will avoid Heparin drip. Will trial Plavix load 600mg now followed by 75mg daily starting tomorrow (discussed with interventional attending)  - Echo can be ordered, to be obtained in the AM   - Please call Cardiology with any dynamic EKG/Troponin changes or return of symptoms   - Rapid COVID in preparation for possible cath  - Patient discussed with interventional attending with possibility of add-on for angiogram on 8/25 given clinical course         Hilary Patel MD   Cardiology Fellow     This consult note is preliminary until cosigned by the attending cardiologist. The patient is a 58 y/o male with PMH of hyperparathyroidoism, hepatitis B on vemlidy, and GERD who is s/p robotic laproscopic  pelvic mass excision on 8/25/22 for a presacral mass. Cardiology was consulted due to post-procedural chest pain with EKG changes. Chest pain now resolved.     Chest pain   - The patient's chest pain does have some features concerning for unstable angina   - EKG with t-wave inversions in the anterior leads  - Troponin <6  - Pain described as a pressure and resolved with administration of Nitro, Metoprolol and Aspirin   - Troponin: Please trend Troponin to peak   - Blood pressures in both arms, especially if patient is having chest pain  - Please obtain a repeat EKG and cardiac biomarkers for any chest pain recurrence   - Given patient's high bleeding risk post-surgery and recent Aspirin load, will avoid Heparin drip. Will trial Plavix load 600mg now followed by 75mg daily starting tomorrow (discussed with interventional attending)  - Echo can be ordered, to be obtained in the AM   - Please call Cardiology with any dynamic EKG/Troponin changes or return of symptoms   - Rapid COVID in preparation for possible cath  - Patient discussed with interventional attending with possibility of add-on for angiogram on 8/25 given clinical course         Hilary Patel MD   Cardiology Fellow     This consult note is preliminary until cosigned by the attending cardiologist. The patient is a 56 y/o male with PMH of hyperparathyroidoism, hepatitis B on vemlidy, and GERD who is s/p robotic laproscopic  pelvic mass excision on 8/25/22 for a presacral mass. Cardiology was consulted due to post-procedural chest pain with EKG changes. Chest pain now resolved.     Chest pain   - The patient's chest pain does have some features concerning for unstable angina   - EKG with t-wave inversions in the anterior leads; please repeat EKG to evaluate for any dynamic changes   - Troponin <6  - Pain described as a pressure and resolved with administration of Nitro, Metoprolol and Aspirin   - Troponin: Please trend Troponin to peak   - Blood pressures in both arms, especially if patient is having chest pain  - Please obtain a repeat EKG and cardiac biomarkers for any chest pain recurrence   - Given patient's high bleeding risk post-surgery and recent Aspirin load, will avoid Heparin drip. Will trial Plavix load 600mg now followed by 75mg daily starting tomorrow (discussed with interventional attending)  - Given ACS, patient will require initiation of beta blocker and statin   - Echo can be ordered, to be obtained in the AM   - Please call Cardiology with any dynamic EKG/Troponin changes or return of symptoms   - Rapid COVID in preparation for possible cath  - Patient discussed with interventional attending with possibility of add-on for angiogram on 8/25 given clinical course         Hilary Patel MD   Cardiology Fellow     This consult note is preliminary until cosigned by the attending cardiologist.

## 2022-08-26 ENCOUNTER — RESULT REVIEW (OUTPATIENT)
Age: 57
End: 2022-08-26

## 2022-08-26 LAB
ANION GAP SERPL CALC-SCNC: 10 MMOL/L — SIGNIFICANT CHANGE UP (ref 5–17)
APTT BLD: 27.5 SEC — SIGNIFICANT CHANGE UP (ref 27.5–35.5)
BUN SERPL-MCNC: 13 MG/DL — SIGNIFICANT CHANGE UP (ref 7–23)
CALCIUM SERPL-MCNC: 9 MG/DL — SIGNIFICANT CHANGE UP (ref 8.4–10.5)
CHLORIDE SERPL-SCNC: 103 MMOL/L — SIGNIFICANT CHANGE UP (ref 96–108)
CO2 SERPL-SCNC: 23 MMOL/L — SIGNIFICANT CHANGE UP (ref 22–31)
CREAT SERPL-MCNC: 0.74 MG/DL — SIGNIFICANT CHANGE UP (ref 0.5–1.3)
EGFR: 104 ML/MIN/1.73M2 — SIGNIFICANT CHANGE UP
GLUCOSE SERPL-MCNC: 149 MG/DL — HIGH (ref 70–99)
HCT VFR BLD CALC: 40.5 % — SIGNIFICANT CHANGE UP (ref 39–50)
HGB BLD-MCNC: 13.5 G/DL — SIGNIFICANT CHANGE UP (ref 13–17)
INR BLD: 1.16 RATIO — SIGNIFICANT CHANGE UP (ref 0.88–1.16)
MAGNESIUM SERPL-MCNC: 2.1 MG/DL — SIGNIFICANT CHANGE UP (ref 1.6–2.6)
MCHC RBC-ENTMCNC: 32 PG — SIGNIFICANT CHANGE UP (ref 27–34)
MCHC RBC-ENTMCNC: 33.3 GM/DL — SIGNIFICANT CHANGE UP (ref 32–36)
MCV RBC AUTO: 96 FL — SIGNIFICANT CHANGE UP (ref 80–100)
NRBC # BLD: 0 /100 WBCS — SIGNIFICANT CHANGE UP (ref 0–0)
PHOSPHATE SERPL-MCNC: 3.2 MG/DL — SIGNIFICANT CHANGE UP (ref 2.5–4.5)
PLATELET # BLD AUTO: 146 K/UL — LOW (ref 150–400)
POTASSIUM SERPL-MCNC: 4.2 MMOL/L — SIGNIFICANT CHANGE UP (ref 3.5–5.3)
POTASSIUM SERPL-SCNC: 4.2 MMOL/L — SIGNIFICANT CHANGE UP (ref 3.5–5.3)
PROTHROM AB SERPL-ACNC: 13.5 SEC — HIGH (ref 10.5–13.4)
RBC # BLD: 4.22 M/UL — SIGNIFICANT CHANGE UP (ref 4.2–5.8)
RBC # FLD: 12.4 % — SIGNIFICANT CHANGE UP (ref 10.3–14.5)
SODIUM SERPL-SCNC: 136 MMOL/L — SIGNIFICANT CHANGE UP (ref 135–145)
TROPONIN T, HIGH SENSITIVITY RESULT: <6 NG/L — SIGNIFICANT CHANGE UP (ref 0–51)
WBC # BLD: 9.26 K/UL — SIGNIFICANT CHANGE UP (ref 3.8–10.5)
WBC # FLD AUTO: 9.26 K/UL — SIGNIFICANT CHANGE UP (ref 3.8–10.5)

## 2022-08-26 PROCEDURE — 93010 ELECTROCARDIOGRAM REPORT: CPT

## 2022-08-26 PROCEDURE — 99221 1ST HOSP IP/OBS SF/LOW 40: CPT

## 2022-08-26 PROCEDURE — 93306 TTE W/DOPPLER COMPLETE: CPT | Mod: 26

## 2022-08-26 PROCEDURE — 88307 TISSUE EXAM BY PATHOLOGIST: CPT | Mod: 26

## 2022-08-26 PROCEDURE — 78452 HT MUSCLE IMAGE SPECT MULT: CPT | Mod: 26

## 2022-08-26 PROCEDURE — 93016 CV STRESS TEST SUPVJ ONLY: CPT

## 2022-08-26 PROCEDURE — 93018 CV STRESS TEST I&R ONLY: CPT

## 2022-08-26 RX ORDER — METOPROLOL TARTRATE 50 MG
25 TABLET ORAL DAILY
Refills: 0 | Status: DISCONTINUED | OUTPATIENT
Start: 2022-08-26 | End: 2022-08-30

## 2022-08-26 RX ORDER — BENZOCAINE AND MENTHOL 5; 1 G/100ML; G/100ML
1 LIQUID ORAL EVERY 6 HOURS
Refills: 0 | Status: DISCONTINUED | OUTPATIENT
Start: 2022-08-26 | End: 2022-08-30

## 2022-08-26 RX ORDER — ATORVASTATIN CALCIUM 80 MG/1
40 TABLET, FILM COATED ORAL AT BEDTIME
Refills: 0 | Status: DISCONTINUED | OUTPATIENT
Start: 2022-08-26 | End: 2022-08-30

## 2022-08-26 RX ORDER — ASPIRIN/CALCIUM CARB/MAGNESIUM 324 MG
81 TABLET ORAL DAILY
Refills: 0 | Status: DISCONTINUED | OUTPATIENT
Start: 2022-08-26 | End: 2022-08-30

## 2022-08-26 RX ADMIN — Medication 81 MILLIGRAM(S): at 12:02

## 2022-08-26 RX ADMIN — CLOPIDOGREL BISULFATE 600 MILLIGRAM(S): 75 TABLET, FILM COATED ORAL at 06:25

## 2022-08-26 RX ADMIN — Medication 400 MILLIGRAM(S): at 06:48

## 2022-08-26 RX ADMIN — FAMOTIDINE 40 MILLIGRAM(S): 10 INJECTION INTRAVENOUS at 11:59

## 2022-08-26 RX ADMIN — ATORVASTATIN CALCIUM 40 MILLIGRAM(S): 80 TABLET, FILM COATED ORAL at 21:14

## 2022-08-26 RX ADMIN — ENOXAPARIN SODIUM 40 MILLIGRAM(S): 100 INJECTION SUBCUTANEOUS at 11:56

## 2022-08-26 RX ADMIN — PANTOPRAZOLE SODIUM 40 MILLIGRAM(S): 20 TABLET, DELAYED RELEASE ORAL at 05:40

## 2022-08-26 RX ADMIN — TENOFOVIR DISOPROXIL FUMARATE 25 MILLIGRAM(S): 300 TABLET, FILM COATED ORAL at 11:56

## 2022-08-26 RX ADMIN — Medication 25 MILLIGRAM(S): at 12:02

## 2022-08-26 NOTE — PHYSICAL THERAPY INITIAL EVALUATION ADULT - PERTINENT HX OF CURRENT PROBLEM, REHAB EVAL
PMH of hyperparathyroid, hepatitis B on vemlidy, GERD,  c/o RLQ abdominal  and pelvic pain , CT revealed presacral mass with some compression on the rectum, he presents today for ERP robotic assisted pelvic mass excision, flexible sigmoid, possible low anterior resection on 8/25/22 s/p Robotic Laparoscopic Assist Presacral Cyst Excision

## 2022-08-26 NOTE — PROGRESS NOTE ADULT - ASSESSMENT
57 y M s/p Robotic Laparoscopic Assist Presacral Cyst Excision    - f/u cards about diagnostic cath  - plavix loading dose 600 mg given  - repeat COVID  - Diet: advance as tolerated  - Activity- OOB with assistance  - Labs: am labs  - Pain medication as needed   - DVT ppx  - Incentive spirometry    Red Team [id number] 57 y M s/p Robotic Laparoscopic Assist Presacral Cyst Excision    - f/u cards about diagnostic cath, appreciate recs  - Plavix loading dose 600 mg given, w/ start on 75 mg after  - D/C demetrice KRAUS @ 1800  - Diet: advance as tolerated  - Activity- OOB with assistance  - Labs: am labs  - Pain medication as needed   - DVT ppx  - Incentive spirometry    Red Team [id number]

## 2022-08-26 NOTE — PROGRESS NOTE ADULT - SUBJECTIVE AND OBJECTIVE BOX
Red Team Surgery Daily Progress Note    Subjective:   Patient seen at bedside this AM. Denies acute onset abdominal pain, N/V/D, fevers, chills, SOB, CP, lightheadedness    24h Events:   - In the postop setting, patient was having chest pain, EKG with T wave inversion and Troponin 6. Cards consulted. Repeat Troponin ordered at midnight as per their rec which was 6. Cards recommended a loading dose of 600 mg Plavix which was given. Cards will try to add on for diagnostic cath today. The only request they made to preop this patient was to order repeat COVID test which was negative. Patient without chest pain all night, vitals stable.     Objective:  Vital Signs  T(C): 36.6 (08-26 @ 00:53), Max: 36.7 (08-25 @ 06:53)  HR: 82 (08-26 @ 00:53) (80 - 114)  BP: 114/75 (08-26 @ 00:54) (114/75 - 143/88)  RR: 18 (08-26 @ 00:53) (15 - 18)  SpO2: 93% (08-26 @ 00:53) (92% - 100%)  08-25-22 @ 07:01  -  08-26-22 @ 04:22  --------------------------------------------------------  IN:  Total IN: 0 mL    OUT:    Indwelling Catheter - Urethral (mL): 595 mL  Total OUT: 595 mL    Total NET: -595 mL          Physical Exam:  GEN: resting in bed comfortably in NAD  RESP: no increased WOB  ABD: soft, non-distended, nontender  EXTR: warm, well-perfused without gross deformities; spontaneous movement in b/l U/L extrem  NEURO: AAOx4    Labs:                        13.5   9.26  )-----------( 146      ( 26 Aug 2022 00:48 )             40.5   08-26    136  |  103  |  13  ----------------------------<  149<H>  4.2   |  23  |  0.74    Ca    9.0      26 Aug 2022 00:48  Phos  3.2     08-26  Mg     2.1     08-26      CAPILLARY BLOOD GLUCOSE      POCT Blood Glucose.: 113 mg/dL (25 Aug 2022 06:51)      Medications:   MEDICATIONS  (STANDING):  acetaminophen   IVPB .. 1000 milliGRAM(s) IV Intermittent every 6 hours  clopidogrel Tablet 600 milliGRAM(s) Oral once  enoxaparin Injectable 40 milliGRAM(s) SubCutaneous every 24 hours  famotidine    Tablet 40 milliGRAM(s) Oral daily  ketorolac   Injectable 15 milliGRAM(s) IV Push every 6 hours  lactated ringers. 1000 milliLiter(s) (40 mL/Hr) IV Continuous <Continuous>  morphine PF Spinal 0.1 milliGRAM(s) IntraThecal. once  pantoprazole    Tablet 40 milliGRAM(s) Oral before breakfast  tenofovir alafenamide (VEMLIDY) 25 milliGRAM(s) Oral daily    MEDICATIONS  (PRN):  naloxone Injectable 0.1 milliGRAM(s) IV Push every 3 minutes PRN For ANY of the following changes in patient status:  A. RR LESS THAN 10 breaths per minute, B. Oxygen saturation LESS THAN 90%, C. Sedation score of 6  ondansetron Injectable 4 milliGRAM(s) IV Push every 6 hours PRN Nausea  oxyCODONE    IR 2.5 milliGRAM(s) Oral every 4 hours PRN Moderate Pain (4 - 6)  oxyCODONE    IR 5 milliGRAM(s) Oral every 4 hours PRN Severe Pain (7 - 10)      Imaging:       Red Team Surgery Daily Progress Note    Subjective:   Patient seen at bedside this AM. Denies acute onset abdominal pain, N/V/D, fevers, chills, SOB, CP, lightheadedness    24h Events:   - In the postop setting, patient was having chest pain, EKG with T wave inversion and Troponin 6. Cards consulted. Repeat Troponin ordered at midnight as per their rec which was 6. Cards recommended a loading dose of 600 mg Plavix which was given in the AM. Cards will try to add on for diagnostic cath today. The only request they made to preop this patient was to order repeat COVID test which was negative. Patient without chest pain all night, vitals stable.     Objective:  Vital Signs  T(C): 36.6 (08-26 @ 00:53), Max: 36.7 (08-25 @ 06:53)  HR: 82 (08-26 @ 00:53) (80 - 114)  BP: 114/75 (08-26 @ 00:54) (114/75 - 143/88)  RR: 18 (08-26 @ 00:53) (15 - 18)  SpO2: 93% (08-26 @ 00:53) (92% - 100%)  08-25-22 @ 07:01  -  08-26-22 @ 04:22  --------------------------------------------------------  IN:  Total IN: 0 mL    OUT:    Indwelling Catheter - Urethral (mL): 595 mL  Total OUT: 595 mL    Total NET: -595 mL          Physical Exam:  GEN: resting in bed comfortably in NAD  RESP: no increased WOB  ABD: soft, mildly distended, mild tenderness  EXTR: warm, well-perfused without gross deformities; spontaneous movement in b/l U/L extrem  NEURO: AAOx4    Labs:                        13.5   9.26  )-----------( 146      ( 26 Aug 2022 00:48 )             40.5   08-26    136  |  103  |  13  ----------------------------<  149<H>  4.2   |  23  |  0.74    Ca    9.0      26 Aug 2022 00:48  Phos  3.2     08-26  Mg     2.1     08-26      CAPILLARY BLOOD GLUCOSE      POCT Blood Glucose.: 113 mg/dL (25 Aug 2022 06:51)      Medications:   MEDICATIONS  (STANDING):  acetaminophen   IVPB .. 1000 milliGRAM(s) IV Intermittent every 6 hours  clopidogrel Tablet 600 milliGRAM(s) Oral once  enoxaparin Injectable 40 milliGRAM(s) SubCutaneous every 24 hours  famotidine    Tablet 40 milliGRAM(s) Oral daily  ketorolac   Injectable 15 milliGRAM(s) IV Push every 6 hours  lactated ringers. 1000 milliLiter(s) (40 mL/Hr) IV Continuous <Continuous>  morphine PF Spinal 0.1 milliGRAM(s) IntraThecal. once  pantoprazole    Tablet 40 milliGRAM(s) Oral before breakfast  tenofovir alafenamide (VEMLIDY) 25 milliGRAM(s) Oral daily    MEDICATIONS  (PRN):  naloxone Injectable 0.1 milliGRAM(s) IV Push every 3 minutes PRN For ANY of the following changes in patient status:  A. RR LESS THAN 10 breaths per minute, B. Oxygen saturation LESS THAN 90%, C. Sedation score of 6  ondansetron Injectable 4 milliGRAM(s) IV Push every 6 hours PRN Nausea  oxyCODONE    IR 2.5 milliGRAM(s) Oral every 4 hours PRN Moderate Pain (4 - 6)  oxyCODONE    IR 5 milliGRAM(s) Oral every 4 hours PRN Severe Pain (7 - 10)      Imaging:

## 2022-08-26 NOTE — PROGRESS NOTE ADULT - SUBJECTIVE AND OBJECTIVE BOX
Day 1 of Anesthesia Pain Management Service    SUBJECTIVE:  Pain Scale Score:          [X] Refer to charted pain scores    THERAPY:    s/p neuraxial PF morphine 200mcg    MEDICATIONS  (STANDING):  acetaminophen   IVPB .. 1000 milliGRAM(s) IV Intermittent every 6 hours  enoxaparin Injectable 40 milliGRAM(s) SubCutaneous every 24 hours  famotidine    Tablet 40 milliGRAM(s) Oral daily  ketorolac   Injectable 15 milliGRAM(s) IV Push every 6 hours  lactated ringers. 1000 milliLiter(s) (40 mL/Hr) IV Continuous <Continuous>  morphine PF Spinal 0.1 milliGRAM(s) IntraThecal. once  pantoprazole    Tablet 40 milliGRAM(s) Oral before breakfast  tenofovir alafenamide (VEMLIDY) 25 milliGRAM(s) Oral daily    MEDICATIONS  (PRN):  naloxone Injectable 0.1 milliGRAM(s) IV Push every 3 minutes PRN For ANY of the following changes in patient status:  A. RR LESS THAN 10 breaths per minute, B. Oxygen saturation LESS THAN 90%, C. Sedation score of 6  ondansetron Injectable 4 milliGRAM(s) IV Push every 6 hours PRN Nausea  oxyCODONE    IR 2.5 milliGRAM(s) Oral every 4 hours PRN Moderate Pain (4 - 6)  oxyCODONE    IR 5 milliGRAM(s) Oral every 4 hours PRN Severe Pain (7 - 10)      OBJECTIVE:    Sedation:        	[X] Alert	[ ] Drowsy	[ ] Arousable      [ ] Asleep       [ ] Unresponsive    Side Effects:	[X] None	[ ] Nausea	[ ] Vomiting         [ ] Pruritus  		[ ] Weakness            [ ] Numbness	          [ ] Other:    Vital Signs Last 24 Hrs  T(C): 36.4 (26 Aug 2022 05:04), Max: 36.7 (25 Aug 2022 21:53)  T(F): 97.6 (26 Aug 2022 05:04), Max: 98 (25 Aug 2022 21:53)  HR: 84 (26 Aug 2022 09:05) (69 - 114)  BP: 157/88 (26 Aug 2022 09:05) (114/75 - 157/88)  BP(mean): 103 (25 Aug 2022 21:00) (89 - 112)  RR: 18 (26 Aug 2022 05:04) (15 - 18)  SpO2: 97% (26 Aug 2022 09:05) (92% - 100%)    Parameters below as of 26 Aug 2022 09:05  Patient On (Oxygen Delivery Method): room air        ASSESSMENT/ PLAN  [X] Patient transitioned to prn analgesics  [X] Pain management per primary service, pain service to sign off   [X]Documentation and Verification of current medications

## 2022-08-26 NOTE — PHYSICAL THERAPY INITIAL EVALUATION ADULT - PLANNED THERAPY INTERVENTIONS, PT EVAL
Stair Negotiation Training: Patient will be able to negotiate up & down 1 flight of stairs independently with bilateral rails, step to gait pattern, in 4 weeks./gait training/transfer training

## 2022-08-27 LAB
ANION GAP SERPL CALC-SCNC: 10 MMOL/L — SIGNIFICANT CHANGE UP (ref 5–17)
BUN SERPL-MCNC: 12 MG/DL — SIGNIFICANT CHANGE UP (ref 7–23)
CALCIUM SERPL-MCNC: 9 MG/DL — SIGNIFICANT CHANGE UP (ref 8.4–10.5)
CHLORIDE SERPL-SCNC: 105 MMOL/L — SIGNIFICANT CHANGE UP (ref 96–108)
CO2 SERPL-SCNC: 24 MMOL/L — SIGNIFICANT CHANGE UP (ref 22–31)
CREAT SERPL-MCNC: 0.76 MG/DL — SIGNIFICANT CHANGE UP (ref 0.5–1.3)
EGFR: 104 ML/MIN/1.73M2 — SIGNIFICANT CHANGE UP
GLUCOSE SERPL-MCNC: 104 MG/DL — HIGH (ref 70–99)
HCT VFR BLD CALC: 39.8 % — SIGNIFICANT CHANGE UP (ref 39–50)
HGB BLD-MCNC: 13.1 G/DL — SIGNIFICANT CHANGE UP (ref 13–17)
MAGNESIUM SERPL-MCNC: 2 MG/DL — SIGNIFICANT CHANGE UP (ref 1.6–2.6)
MCHC RBC-ENTMCNC: 31.6 PG — SIGNIFICANT CHANGE UP (ref 27–34)
MCHC RBC-ENTMCNC: 32.9 GM/DL — SIGNIFICANT CHANGE UP (ref 32–36)
MCV RBC AUTO: 95.9 FL — SIGNIFICANT CHANGE UP (ref 80–100)
NRBC # BLD: 0 /100 WBCS — SIGNIFICANT CHANGE UP (ref 0–0)
PHOSPHATE SERPL-MCNC: 1.4 MG/DL — LOW (ref 2.5–4.5)
PHOSPHATE SERPL-MCNC: 3.1 MG/DL — SIGNIFICANT CHANGE UP (ref 2.5–4.5)
PLATELET # BLD AUTO: 124 K/UL — LOW (ref 150–400)
POTASSIUM SERPL-MCNC: 3.8 MMOL/L — SIGNIFICANT CHANGE UP (ref 3.5–5.3)
POTASSIUM SERPL-SCNC: 3.8 MMOL/L — SIGNIFICANT CHANGE UP (ref 3.5–5.3)
RBC # BLD: 4.15 M/UL — LOW (ref 4.2–5.8)
RBC # FLD: 12.6 % — SIGNIFICANT CHANGE UP (ref 10.3–14.5)
SARS-COV-2 RNA SPEC QL NAA+PROBE: SIGNIFICANT CHANGE UP
SODIUM SERPL-SCNC: 139 MMOL/L — SIGNIFICANT CHANGE UP (ref 135–145)
WBC # BLD: 5.99 K/UL — SIGNIFICANT CHANGE UP (ref 3.8–10.5)
WBC # FLD AUTO: 5.99 K/UL — SIGNIFICANT CHANGE UP (ref 3.8–10.5)

## 2022-08-27 PROCEDURE — 99233 SBSQ HOSP IP/OBS HIGH 50: CPT

## 2022-08-27 RX ORDER — CLOPIDOGREL BISULFATE 75 MG/1
75 TABLET, FILM COATED ORAL DAILY
Refills: 0 | Status: DISCONTINUED | OUTPATIENT
Start: 2022-08-27 | End: 2022-08-30

## 2022-08-27 RX ORDER — DEXTROSE MONOHYDRATE, SODIUM CHLORIDE, AND POTASSIUM CHLORIDE 50; .745; 4.5 G/1000ML; G/1000ML; G/1000ML
1000 INJECTION, SOLUTION INTRAVENOUS
Refills: 0 | Status: DISCONTINUED | OUTPATIENT
Start: 2022-08-27 | End: 2022-08-30

## 2022-08-27 RX ORDER — TAMSULOSIN HYDROCHLORIDE 0.4 MG/1
0.4 CAPSULE ORAL AT BEDTIME
Refills: 0 | Status: DISCONTINUED | OUTPATIENT
Start: 2022-08-27 | End: 2022-08-30

## 2022-08-27 RX ORDER — POTASSIUM PHOSPHATE, MONOBASIC POTASSIUM PHOSPHATE, DIBASIC 236; 224 MG/ML; MG/ML
15 INJECTION, SOLUTION INTRAVENOUS ONCE
Refills: 0 | Status: COMPLETED | OUTPATIENT
Start: 2022-08-27 | End: 2022-08-27

## 2022-08-27 RX ORDER — SODIUM,POTASSIUM PHOSPHATES 278-250MG
2 POWDER IN PACKET (EA) ORAL ONCE
Refills: 0 | Status: COMPLETED | OUTPATIENT
Start: 2022-08-27 | End: 2022-08-27

## 2022-08-27 RX ADMIN — Medication 63.75 MILLIMOLE(S): at 12:42

## 2022-08-27 RX ADMIN — PANTOPRAZOLE SODIUM 40 MILLIGRAM(S): 20 TABLET, DELAYED RELEASE ORAL at 05:33

## 2022-08-27 RX ADMIN — ENOXAPARIN SODIUM 40 MILLIGRAM(S): 100 INJECTION SUBCUTANEOUS at 12:18

## 2022-08-27 RX ADMIN — CLOPIDOGREL BISULFATE 75 MILLIGRAM(S): 75 TABLET, FILM COATED ORAL at 12:24

## 2022-08-27 RX ADMIN — POTASSIUM PHOSPHATE, MONOBASIC POTASSIUM PHOSPHATE, DIBASIC 62.5 MILLIMOLE(S): 236; 224 INJECTION, SOLUTION INTRAVENOUS at 16:45

## 2022-08-27 RX ADMIN — TAMSULOSIN HYDROCHLORIDE 0.4 MILLIGRAM(S): 0.4 CAPSULE ORAL at 21:31

## 2022-08-27 RX ADMIN — Medication 25 MILLIGRAM(S): at 05:33

## 2022-08-27 RX ADMIN — FAMOTIDINE 40 MILLIGRAM(S): 10 INJECTION INTRAVENOUS at 12:18

## 2022-08-27 RX ADMIN — ATORVASTATIN CALCIUM 40 MILLIGRAM(S): 80 TABLET, FILM COATED ORAL at 21:31

## 2022-08-27 RX ADMIN — Medication 81 MILLIGRAM(S): at 12:18

## 2022-08-27 RX ADMIN — TENOFOVIR DISOPROXIL FUMARATE 25 MILLIGRAM(S): 300 TABLET, FILM COATED ORAL at 12:18

## 2022-08-27 RX ADMIN — BENZOCAINE AND MENTHOL 1 LOZENGE: 5; 1 LIQUID ORAL at 12:28

## 2022-08-27 RX ADMIN — Medication 2 PACKET(S): at 12:24

## 2022-08-27 NOTE — DIETITIAN INITIAL EVALUATION ADULT - FACTORS AFF FOOD INTAKE
Pt endorsed decreased appetite since surgery (8/25). Preference noted for grapes/fruit at this time. States he enjoys the food despite decreased desire to eat.

## 2022-08-27 NOTE — PROGRESS NOTE ADULT - SUBJECTIVE AND OBJECTIVE BOX
Red team Surgery Progress Note    INTERVAL EVENTS:   No acute events overnight. patient straight cath twice overnight     SUBJECTIVE: Patient seen and examined at bedside with surgical team, denies pain, nausea or vomiting.     OBJECTIVE:    Vital Signs Last 24 Hrs  T(C): 37.1 (27 Aug 2022 09:08), Max: 37.1 (27 Aug 2022 09:08)  T(F): 98.7 (27 Aug 2022 09:08), Max: 98.7 (27 Aug 2022 09:08)  HR: 74 (27 Aug 2022 09:08) (74 - 83)  BP: 127/77 (27 Aug 2022 09:08) (119/81 - 138/80)  BP(mean): --  RR: 18 (27 Aug 2022 09:08) (18 - 18)  SpO2: 96% (27 Aug 2022 09:08) (94% - 98%)    Parameters below as of 27 Aug 2022 09:08  Patient On (Oxygen Delivery Method): room air    I&O's Detail    26 Aug 2022 07:01  -  27 Aug 2022 07:00  --------------------------------------------------------  IN:    Lactated Ringers: 80 mL    Oral Fluid: 720 mL  Total IN: 800 mL    OUT:    Intermittent Catheterization - Urethral (mL): 1350 mL    Voided (mL): 800 mL  Total OUT: 2150 mL    Total NET: -1350 mL      27 Aug 2022 07:01  -  27 Aug 2022 09:26  --------------------------------------------------------  IN:    Oral Fluid: 360 mL  Total IN: 360 mL    OUT:  Total OUT: 0 mL    Total NET: 360 mL      MEDICATIONS  (STANDING):  aspirin  chewable 81 milliGRAM(s) Oral daily  atorvastatin 40 milliGRAM(s) Oral at bedtime  clopidogrel Tablet 75 milliGRAM(s) Oral daily  enoxaparin Injectable 40 milliGRAM(s) SubCutaneous every 24 hours  famotidine    Tablet 40 milliGRAM(s) Oral daily  metoprolol succinate ER 25 milliGRAM(s) Oral daily  pantoprazole    Tablet 40 milliGRAM(s) Oral before breakfast  sodium chloride 0.9% with potassium chloride 20 mEq/L 1000 milliLiter(s) (125 mL/Hr) IV Continuous <Continuous>  tenofovir alafenamide (VEMLIDY) 25 milliGRAM(s) Oral daily    MEDICATIONS  (PRN):  benzocaine 15 mG/menthol 3.6 mG Lozenge 1 Lozenge Oral every 6 hours PRN Sore Throat  oxyCODONE    IR 2.5 milliGRAM(s) Oral every 4 hours PRN Moderate Pain (4 - 6)  oxyCODONE    IR 5 milliGRAM(s) Oral every 4 hours PRN Severe Pain (7 - 10)      PHYSICAL EXAM:  Constitutional: A&Ox3, NAD  Respiratory: Unlabored breathing  Abdomen: Soft, nondistended, NTTP. No rebound or guarding.   Extremities: WWP, RODRIGUEZ spontaneously    LABS:                        13.1   5.99  )-----------( 124      ( 27 Aug 2022 07:57 )             39.8     08-27    139  |  105  |  12  ----------------------------<  104<H>  3.8   |  24  |  0.76    Ca    9.0      27 Aug 2022 07:53  Phos  1.4     08-27  Mg     2.0     08-27      PT/INR - ( 26 Aug 2022 00:48 )   PT: 13.5 sec;   INR: 1.16 ratio         PTT - ( 26 Aug 2022 00:48 )  PTT:27.5 sec          IMAGING:

## 2022-08-27 NOTE — PROGRESS NOTE ADULT - ASSESSMENT
57 y M s/p Robotic Laparoscopic Assist Presacral Cyst Excision    - f/u cards: diagnostic cath tomorrow.   - Plavix 75 QD, ASA 81 QD per cardiology recs.   - Diet: Regular, NPO at midnight.   - Activity- OOB with assistance  - Labs: am labs  - Pain medication as needed   - DVT ppx  - Incentive spirometry    Red Team [id number]

## 2022-08-27 NOTE — DIETITIAN INITIAL EVALUATION ADULT - NSFNSGIIOFT_GEN_A_CORE
-Continues on NaCl 0.9% with KCl 20mEq continuous IVF.   -Last BM (8/26): x 2. Not on apparent bowel regimen.

## 2022-08-27 NOTE — DIETITIAN INITIAL EVALUATION ADULT - PERTINENT MEDS FT
MEDICATIONS  (STANDING):  aspirin  chewable 81 milliGRAM(s) Oral daily  atorvastatin 40 milliGRAM(s) Oral at bedtime  clopidogrel Tablet 75 milliGRAM(s) Oral daily  enoxaparin Injectable 40 milliGRAM(s) SubCutaneous every 24 hours  famotidine    Tablet 40 milliGRAM(s) Oral daily  metoprolol succinate ER 25 milliGRAM(s) Oral daily  pantoprazole    Tablet 40 milliGRAM(s) Oral before breakfast  sodium chloride 0.9% with potassium chloride 20 mEq/L 1000 milliLiter(s) (125 mL/Hr) IV Continuous <Continuous>  tenofovir alafenamide (VEMLIDY) 25 milliGRAM(s) Oral daily    MEDICATIONS  (PRN):  benzocaine 15 mG/menthol 3.6 mG Lozenge 1 Lozenge Oral every 6 hours PRN Sore Throat  oxyCODONE    IR 2.5 milliGRAM(s) Oral every 4 hours PRN Moderate Pain (4 - 6)  oxyCODONE    IR 5 milliGRAM(s) Oral every 4 hours PRN Severe Pain (7 - 10)

## 2022-08-27 NOTE — DIETITIAN INITIAL EVALUATION ADULT - ORAL INTAKE PTA/DIET HISTORY
Pt reports good appetite PTA; consumes three meals daily. Prefers Asian foods (states "Asians eat a lot of salt"), fish/shrimp. Denies food allergies, denies intolerance to chewing/swallowing. Takes daily MVI. Denies N/V; noted with occasional constipation; increases fiber intake to promote +BM.

## 2022-08-27 NOTE — DIETITIAN INITIAL EVALUATION ADULT - ADD RECOMMEND
1. Recommend to continue regular diet as prescribed. Consider low sodium diet. Defer consistency to medical team, SLP.   2. Encourage and monitor PO intake. Encourage use of daily menus; honor dietary preferences as expressed as able. Consider addition of oral nutrition supplement if persistent suboptimal PO intake noted.   3. Recommend multivitamin (if no medication contraindications) to aid in prevention of micronutrient deficiencies.   4. Monitor wt trends/labs/skin integrity/hydration status/bowel regularity.

## 2022-08-27 NOTE — DIETITIAN INITIAL EVALUATION ADULT - REASON FOR ADMISSION
Pt is a 58 yo M with PMH: hypothyroidism, hepatitis B on vemlidy, GERD, c/o RLQ abdominal and pelvic pain. 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 Pt now s/p Robotic Laparoscopic Assist Presacral Cyst Excision on 8/25. Course c/b tachycardia/chest pain. Cardiology following. Plan for cardiac cath 8/28.

## 2022-08-27 NOTE — DIETITIAN INITIAL EVALUATION ADULT - PERTINENT LABORATORY DATA
08-27    139  |  105  |  12  ----------------------------<  104<H>  3.8   |  24  |  0.76    Ca    9.0      27 Aug 2022 07:53  Phos  1.4     08-27  Mg     2.0     08-27    A1C with Estimated Average Glucose Result: 5.9 % (08-18-22 @ 10:16)

## 2022-08-27 NOTE — DIETITIAN INITIAL EVALUATION ADULT - OTHER INFO
Dosing wt (8/25): 270.4 lbs  Wt trend (per Berna MOSQUEDA): (8/18): 270 lbs; (8/5): 285 lbs; (10/14/21): 264.8 lbs; (9/28/21): 269 lbs Dosing wt (8/25): 270.4 lbs  [facility] (8/18): 270 lbs; (8/5): 285 lbs; (10/14/21): 264.8 lbs; (9/28/21): 269 lbs  Pt reports UBW ~264 lbs. Appears consistent, with some slight fluctuations.

## 2022-08-27 NOTE — PROGRESS NOTE ADULT - SUBJECTIVE AND OBJECTIVE BOX
Patient seen and examined at bedside.    Overnight Events:     No AEON     Denies any chest pain, SOB, palpitations      Current Meds:  aspirin  chewable 81 milliGRAM(s) Oral daily  atorvastatin 40 milliGRAM(s) Oral at bedtime  benzocaine 15 mG/menthol 3.6 mG Lozenge 1 Lozenge Oral every 6 hours PRN  enoxaparin Injectable 40 milliGRAM(s) SubCutaneous every 24 hours  famotidine    Tablet 40 milliGRAM(s) Oral daily  metoprolol succinate ER 25 milliGRAM(s) Oral daily  oxyCODONE    IR 2.5 milliGRAM(s) Oral every 4 hours PRN  oxyCODONE    IR 5 milliGRAM(s) Oral every 4 hours PRN  pantoprazole    Tablet 40 milliGRAM(s) Oral before breakfast  tenofovir alafenamide (VEMLIDY) 25 milliGRAM(s) Oral daily      Vitals:  T(F): 98.2 (08-27), Max: 98.2 (08-26)  HR: 77 (08-27) (77 - 84)  BP: 125/87 (08-27) (119/81 - 157/88)  RR: 18 (08-27)  SpO2: 94% (08-27)  I&O's Summary    26 Aug 2022 07:01  -  27 Aug 2022 07:00  --------------------------------------------------------  IN: 800 mL / OUT: 2649 mL / NET: -1849 mL        Physical Exam:  Appearance: No acute distress; well appearing  Cardiovascular: RRR, S1, S2, no murmurs, rubs, or gallops; no edema; no JVD  Respiratory: Clear to auscultation bilaterally  Musculoskeletal: No clubbing; no joint deformity   Neurologic: Non-focal  Psychiatry: AAOx3, mood & affect appropriate  Skin: No rashes, ecchymoses, or cyanosis                          13.5   9.26  )-----------( 146      ( 26 Aug 2022 00:48 )             40.5     08-26    136  |  103  |  13  ----------------------------<  149<H>  4.2   |  23  |  0.74    Ca    9.0      26 Aug 2022 00:48  Phos  3.2     08-26  Mg     2.1     08-26      PT/INR - ( 26 Aug 2022 00:48 )   PT: 13.5 sec;   INR: 1.16 ratio         PTT - ( 26 Aug 2022 00:48 )  PTT:27.5 sec  CARDIAC MARKERS ( 26 Aug 2022 00:48 )  <6 ng/L / x     / x     / x     / x     / x      CARDIAC MARKERS ( 25 Aug 2022 19:20 )  x     / x     / x     / 138 U/L / x     / 2.2 ng/mL  CARDIAC MARKERS ( 25 Aug 2022 15:52 )  <6 ng/L / x     / x     / x     / x     / x                  New ECG(s): Personally reviewed    Echo:    Stress Testing:     Cath:    Imaging:    Interpretation of Telemetry:

## 2022-08-27 NOTE — PROGRESS NOTE ADULT - ASSESSMENT
The patient is a 58 y/o male with PMH of hyperparathyroidoism, hepatitis B on vemlidy, and GERD who is s/p robotic laproscopic  pelvic mass excision on 8/25/22 for a presacral mass. Cardiology was consulted due to post-procedural chest pain with EKG changes. Chest pain now resolved.     Chest pain   - The patient's chest pain does have some features concerning for unstable angina   - Please obtain a repeat EKG and cardiac biomarkers for any chest pain recurrence   - Given patient's high bleeding risk post-surgery and recent Aspirin load, will avoid Heparin drip   - Continue aspirin 81mg and start plavix 75mg once daily   - Continue metoprolol 25mg and atorvastatin 40mg   - Echo shows mild global systolic dysfunction, EF 52%   - Please call Cardiology with any dynamic EKG/Troponin changes or return of symptoms   - Rapid COVID in preparation for possible cath  - Pharm stress test showed: "large, moderate defects in the inferior,  inferoapical, apical lateral, basal septal, and basal  inferolateral walls that are partially reversible  suggestive of infarct with at least moderate savanah-infarct  ischemia"   - Plan for cath on 8/28

## 2022-08-28 LAB
ALBUMIN SERPL ELPH-MCNC: 4 G/DL — SIGNIFICANT CHANGE UP (ref 3.3–5)
ALP SERPL-CCNC: 49 U/L — SIGNIFICANT CHANGE UP (ref 40–120)
ALT FLD-CCNC: 16 U/L — SIGNIFICANT CHANGE UP (ref 10–45)
ANION GAP SERPL CALC-SCNC: 10 MMOL/L — SIGNIFICANT CHANGE UP (ref 5–17)
APTT BLD: 29.8 SEC — SIGNIFICANT CHANGE UP (ref 27.5–35.5)
AST SERPL-CCNC: 22 U/L — SIGNIFICANT CHANGE UP (ref 10–40)
BILIRUB DIRECT SERPL-MCNC: 0.1 MG/DL — SIGNIFICANT CHANGE UP (ref 0–0.3)
BILIRUB SERPL-MCNC: 0.7 MG/DL — SIGNIFICANT CHANGE UP (ref 0.2–1.2)
BLD GP AB SCN SERPL QL: NEGATIVE — SIGNIFICANT CHANGE UP
BUN SERPL-MCNC: 9 MG/DL — SIGNIFICANT CHANGE UP (ref 7–23)
CALCIUM SERPL-MCNC: 9.5 MG/DL — SIGNIFICANT CHANGE UP (ref 8.4–10.5)
CHLORIDE SERPL-SCNC: 106 MMOL/L — SIGNIFICANT CHANGE UP (ref 96–108)
CO2 SERPL-SCNC: 23 MMOL/L — SIGNIFICANT CHANGE UP (ref 22–31)
CREAT SERPL-MCNC: 0.71 MG/DL — SIGNIFICANT CHANGE UP (ref 0.5–1.3)
EGFR: 106 ML/MIN/1.73M2 — SIGNIFICANT CHANGE UP
GGT SERPL-CCNC: 16 U/L — SIGNIFICANT CHANGE UP (ref 9–50)
GLUCOSE SERPL-MCNC: 110 MG/DL — HIGH (ref 70–99)
HCT VFR BLD CALC: 38.2 % — LOW (ref 39–50)
HGB BLD-MCNC: 12.8 G/DL — LOW (ref 13–17)
INR BLD: 1.16 RATIO — SIGNIFICANT CHANGE UP (ref 0.88–1.16)
LIDOCAIN IGE QN: 19 U/L — SIGNIFICANT CHANGE UP (ref 7–60)
MAGNESIUM SERPL-MCNC: 2.1 MG/DL — SIGNIFICANT CHANGE UP (ref 1.6–2.6)
MCHC RBC-ENTMCNC: 31.5 PG — SIGNIFICANT CHANGE UP (ref 27–34)
MCHC RBC-ENTMCNC: 33.5 GM/DL — SIGNIFICANT CHANGE UP (ref 32–36)
MCV RBC AUTO: 94.1 FL — SIGNIFICANT CHANGE UP (ref 80–100)
NRBC # BLD: 0 /100 WBCS — SIGNIFICANT CHANGE UP (ref 0–0)
PHOSPHATE SERPL-MCNC: 2.3 MG/DL — LOW (ref 2.5–4.5)
PLATELET # BLD AUTO: 127 K/UL — LOW (ref 150–400)
POTASSIUM SERPL-MCNC: 3.9 MMOL/L — SIGNIFICANT CHANGE UP (ref 3.5–5.3)
POTASSIUM SERPL-SCNC: 3.9 MMOL/L — SIGNIFICANT CHANGE UP (ref 3.5–5.3)
PROT SERPL-MCNC: 6.7 G/DL — SIGNIFICANT CHANGE UP (ref 6–8.3)
PROTHROM AB SERPL-ACNC: 13.4 SEC — SIGNIFICANT CHANGE UP (ref 10.5–13.4)
RBC # BLD: 4.06 M/UL — LOW (ref 4.2–5.8)
RBC # FLD: 12.3 % — SIGNIFICANT CHANGE UP (ref 10.3–14.5)
RH IG SCN BLD-IMP: POSITIVE — SIGNIFICANT CHANGE UP
SODIUM SERPL-SCNC: 139 MMOL/L — SIGNIFICANT CHANGE UP (ref 135–145)
WBC # BLD: 4.93 K/UL — SIGNIFICANT CHANGE UP (ref 3.8–10.5)
WBC # FLD AUTO: 4.93 K/UL — SIGNIFICANT CHANGE UP (ref 3.8–10.5)

## 2022-08-28 PROCEDURE — 99233 SBSQ HOSP IP/OBS HIGH 50: CPT

## 2022-08-28 RX ORDER — SODIUM,POTASSIUM PHOSPHATES 278-250MG
2 POWDER IN PACKET (EA) ORAL ONCE
Refills: 0 | Status: COMPLETED | OUTPATIENT
Start: 2022-08-28 | End: 2022-08-28

## 2022-08-28 RX ADMIN — OXYCODONE HYDROCHLORIDE 5 MILLIGRAM(S): 5 TABLET ORAL at 01:05

## 2022-08-28 RX ADMIN — CLOPIDOGREL BISULFATE 75 MILLIGRAM(S): 75 TABLET, FILM COATED ORAL at 11:22

## 2022-08-28 RX ADMIN — OXYCODONE HYDROCHLORIDE 5 MILLIGRAM(S): 5 TABLET ORAL at 00:22

## 2022-08-28 RX ADMIN — ENOXAPARIN SODIUM 40 MILLIGRAM(S): 100 INJECTION SUBCUTANEOUS at 11:23

## 2022-08-28 RX ADMIN — TAMSULOSIN HYDROCHLORIDE 0.4 MILLIGRAM(S): 0.4 CAPSULE ORAL at 21:11

## 2022-08-28 RX ADMIN — ATORVASTATIN CALCIUM 40 MILLIGRAM(S): 80 TABLET, FILM COATED ORAL at 21:11

## 2022-08-28 RX ADMIN — Medication 81 MILLIGRAM(S): at 11:23

## 2022-08-28 RX ADMIN — TENOFOVIR DISOPROXIL FUMARATE 25 MILLIGRAM(S): 300 TABLET, FILM COATED ORAL at 11:22

## 2022-08-28 RX ADMIN — FAMOTIDINE 40 MILLIGRAM(S): 10 INJECTION INTRAVENOUS at 14:21

## 2022-08-28 RX ADMIN — Medication 25 MILLIGRAM(S): at 05:59

## 2022-08-28 RX ADMIN — Medication 2 TABLET(S): at 09:51

## 2022-08-28 RX ADMIN — PANTOPRAZOLE SODIUM 40 MILLIGRAM(S): 20 TABLET, DELAYED RELEASE ORAL at 05:59

## 2022-08-28 NOTE — PROGRESS NOTE ADULT - ASSESSMENT
57 y M s/p Robotic Laparoscopic Assist Presacral Cyst Excision    - f/u cards: diagnostic cath tomorrow.   - Plavix 75 QD, ASA 81 QD per cardiology recs.   - Diet: Regular, NPO at midnight.   - Activity- OOB with assistance  - Labs: am labs  - Pain medication as needed   - DVT ppx  - Incentive spirometry    Red Team [id number] 57 y M s/p Robotic Laparoscopic Assist Presacral Cyst Excision    - f/u cards: diagnostic cath today.   - Plavix 75 QD, ASA 81 QD per cardiology recs.   - Diet: Regular, but NPO since midnight for cath.   - Activity- OOB with assistance  - Labs: am labs  - Pain medication as needed   - DVT ppx  - Incentive spirometry    Red Team [id number] 57 y M s/p Robotic Laparoscopic Assist Presacral Cyst Excision    - f/u cards: diagnostic cath tomorrow; plan to make NPO tonight  - Plavix 75 QD, ASA 81 QD per cardiology recs.   - Diet: Regular  - Activity- OOB with assistance  - Labs: am labs  - Pain medication as needed   - DVT ppx  - Incentive spirometry    Red Team [id number]

## 2022-08-28 NOTE — CHART NOTE - NSCHARTNOTEFT_GEN_A_CORE
R1 Event Note    Patient seen and examined at bedside for abdominal pain. Patient stated that he was having 4/10 sharp RUQ abdominal pain. It started this afternoon after he ate lamb chops for lunch and is now preventing him from sleeping. Patient states that he had this pain for the last ten years since he was in [place], He has visited a doctor for it before but was told it was likely acid reflux. He was started on acid medication (PPI/H2 inhibitor?) but it did not help. He has this pain every 3-4 days in the same location with same intensity/severity. This is his first time having this pain after his pelvic cyst excision surgery. He denies any trauma to the area. Denies any skin rashes. Denies pain with movement. Denies pain with breathing. States this pain is very different from the chest pain he was having in PACU and feels more chronic. Is requesting that we work it up while he is in the hospital.      T(C): 36.9 (08-27-22 @ 20:56), Max: 37.2 (08-27-22 @ 13:25)  HR: 75 (08-27-22 @ 20:56) (74 - 84)  BP: 129/81 (08-27-22 @ 20:56) (111/64 - 129/81)  RR: 18 (08-27-22 @ 20:56) (18 - 18)  SpO2: 94% (08-27-22 @ 20:56) (94% - 96%)      PE:  - Gen: NAD  - CV: RRR, normal S1 and S2  - Lungs: CTA b/l  - Abd: moderate distension but soft, nontender to superficial and deep palpation, negative Osei's sign  - Ext: no LE edema, erythema or calf tenderness bilaterally    Labs:  am labs    A/P:   - Patient was due for PRN oxy which was given to help with the RUQ pain so he can sleep  - Prior CT imaging reviewed with senior PGY4 MD Holt, noted to have liver cysts but no other abnormalities, no indication for repeat imaging at this time  - Will consider adding on LFTs, bili, lipase to am labs  - Clinically stable for now     Camron, PGY-1

## 2022-08-28 NOTE — PROVIDER CONTACT NOTE (OTHER) - ASSESSMENT
Pt is AOx4, VSS, see flowsheet. Pt c/o of RUQ pain in a linear form that travels to the midline upper abdominal area. Pt states his pain comes and goes through the day. As per pt, he did not have pain post-surgical, but pain came on about 30 min after having his meals level 4-5, and pain comes on sharply after pt passes flatus. Site of pain normal without discoloration, abdomen is soft and nontender.
Pt voiding w/o difficulty, no c/o pain, discomfort, or bladder fullness. Pt voided 375, .

## 2022-08-28 NOTE — PROGRESS NOTE ADULT - SUBJECTIVE AND OBJECTIVE BOX
Patient seen and examined at bedside.    Overnight Events:   No events. Denies CP, palpitations, SOB.     REVIEW OF SYSTEMS:  Constitutional:     [x ] negative [ ] fevers [ ] chills [ ] weight loss [ ] weight gain  HEENT:                  [x ] negative [ ] dry eyes [ ] eye irritation [ ] postnasal drip [ ] nasal congestion  CV:                         [ x] negative  [ ] chest pain [ ] orthopnea [ ] palpitations [ ] murmur  Resp:                     [x ] negative [ ] cough [ ] shortness of breath [ ] dyspnea [ ] wheezing [ ] sputum [ ]hemoptysis  GI:                          [x ] negative [ ] nausea [ ] vomiting [ ] diarrhea [ ] constipation [ ] abd pain [ ] dysphagia   :                        [ x] negative [ ] dysuria [ ] nocturia [ ] hematuria [ ] increased urinary frequency  Musculoskeletal: [x ] negative [ ] back pain [ ] myalgias [ ] arthralgias [ ] fracture  Skin:                       [ x] negative [ ] rash [ ] itch  Neurological:        [ x] negative [ ] headache [ ] dizziness [ ] syncope [ ] weakness [ ] numbness  Psychiatric:           [ x] negative [ ] anxiety [ ] depression  Endocrine:            [ x] negative [ ] diabetes [ ] thyroid problem  Heme/Lymph:      [ x] negative [ ] anemia [ ] bleeding problem  Allergic/Immune: [ x] negative [ ] itchy eyes [ ] nasal discharge [ ] hives [ ] angioedema    [ x] All other systems negative          Current Meds:  aspirin  chewable 81 milliGRAM(s) Oral daily  atorvastatin 40 milliGRAM(s) Oral at bedtime  benzocaine 15 mG/menthol 3.6 mG Lozenge 1 Lozenge Oral every 6 hours PRN  clopidogrel Tablet 75 milliGRAM(s) Oral daily  enoxaparin Injectable 40 milliGRAM(s) SubCutaneous every 24 hours  famotidine    Tablet 40 milliGRAM(s) Oral daily  metoprolol succinate ER 25 milliGRAM(s) Oral daily  oxyCODONE    IR 2.5 milliGRAM(s) Oral every 4 hours PRN  oxyCODONE    IR 5 milliGRAM(s) Oral every 4 hours PRN  pantoprazole    Tablet 40 milliGRAM(s) Oral before breakfast  sodium chloride 0.9% with potassium chloride 20 mEq/L 1000 milliLiter(s) IV Continuous <Continuous>  tamsulosin 0.4 milliGRAM(s) Oral at bedtime  tenofovir alafenamide (VEMLIDY) 25 milliGRAM(s) Oral daily      Vitals:  T(F): 98.1 (08-28), Max: 98.9 (08-27)  HR: 75 (08-28) (74 - 84)  BP: 110/72 (08-28) (110/72 - 143/95)  RR: 18 (08-28)  SpO2: 94% (08-28)  I&O's Summary    27 Aug 2022 07:01  -  28 Aug 2022 07:00  --------------------------------------------------------  IN: 2255 mL / OUT: 2675 mL / NET: -420 mL    28 Aug 2022 07:01  -  28 Aug 2022 11:44  --------------------------------------------------------  IN: 300 mL / OUT: 800 mL / NET: -500 mL      PHYSICAL EXAM:  Appearance: No acute distress; well appearing  Eyes: EOMI, no scleral icterus   HEENT: Normal oral mucosa  Cardiovascular: RRR, S1, S2, no murmurs, rubs, or gallops; no edema; no JVD  Respiratory: Clear to auscultation bilaterally, no auditory stridor   Gastrointestinal: soft, non-tender, non-distended with normal bowel sounds  Musculoskeletal: No clubbing; no joint deformity   Neurologic: Moving all 4 extremities spontaneously, sensation grossly intact  Psychiatry: AAOx3, mood & affect appropriate  Skin: No rashes, ecchymoses, or cyanosis                          12.8   4.93  )-----------( 127      ( 28 Aug 2022 07:17 )             38.2     08-28    139  |  106  |  9   ----------------------------<  110<H>  3.9   |  23  |  0.71    Ca    9.5      28 Aug 2022 08:23  Phos  2.3     08-28  Mg     2.1     08-28    TPro  6.7  /  Alb  4.0  /  TBili  0.7  /  DBili  0.1  /  AST  22  /  ALT  16  /  AlkPhos  49  08-28    PT/INR - ( 28 Aug 2022 07:19 )   PT: 13.4 sec;   INR: 1.16 ratio         PTT - ( 28 Aug 2022 07:19 )  PTT:29.8 sec  CARDIAC MARKERS ( 26 Aug 2022 00:48 )  <6 ng/L / x     / x     / x     / x     / x      CARDIAC MARKERS ( 25 Aug 2022 19:20 )  x     / x     / x     / 138 U/L / x     / 2.2 ng/mL  CARDIAC MARKERS ( 25 Aug 2022 15:52 )  <6 ng/L / x     / x     / x     / x     / x              DIAGNOSTIC/IMAGING:  Conclusions:  1. Normal mitral valve. Minimal mitral regurgitation.  2. Mildly dilated left atrium.  LA volume index = 41 cc/m2.  3. Mild concentric left ventricular hypertrophy.  4. Mildly decreased left ventricular systolic function.  Mild global hypokinesis. LVEF calculated was 52%.  5. Strain imaging was performed with a HR of 79 bpm and a  BP of 157/88 mmHg . Global L. Strain= -20% which is normal.  6. Indeterminate diastolic function.  7. Normal right atrium.  8. Normal right ventricular size and function.  9. Normal tricuspid valve. No tricuspid regurgitation.  Normal IVC.Unable to calculated RVSP due to insufficient  Doppler signal.  10. No pericardial effusion seen.  *** No previous Echo exam.  ------------------------------------------------------------------------  Confirmed on  8/26/2022 - 17:33:42 by Ally Bhagat M.D.

## 2022-08-28 NOTE — PROGRESS NOTE ADULT - ASSESSMENT
56 y/o male with PMH of hyperparathyroidoism, hepatitis B on vemlidy, and GERD who is s/p robotic laparoscopic  pelvic mass excision on 8/25/22 for a presacral mass. Cardiology was consulted due to post-procedural chest pain with EKG changes. Chest pain now resolved.     #Chest pain   - The patient's chest pain does have some features concerning for unstable angina   - Pharm stress test showed: "large, moderate defects in the inferior,  inferoapical, apical lateral, basal septal, and basal  inferolateral walls that are partially reversible  suggestive of infarct with at least moderate savanah-infarct  ischemia"   - Please obtain a repeat EKG and cardiac biomarkers for any chest pain recurrence   - Given patient's high bleeding risk post-surgery and recent Aspirin load, will avoid Heparin drip   - Continue aspirin 81mg and start plavix 75mg once daily   - Continue metoprolol 25mg and atorvastatin 40mg   - Plan for cath tomorrow (8/29). Please make patient NPO.

## 2022-08-28 NOTE — PROGRESS NOTE ADULT - SUBJECTIVE AND OBJECTIVE BOX
Red Team Surgery Daily Progress Note    Subjective:   Patient seen at bedside this AM. Denies acute onset abdominal pain, N/V/D, fevers, chills, SOB, CP, lightheadedness    24h Events:   - Overnight, patient complaining of chronic RUQ pain, labs sent & PRN oxy given. Please see chart note for details.     Objective:  Vital Signs  T(C): 36.7 (08-28 @ 01:12), Max: 37.2 (08-27 @ 13:25)  HR: 82 (08-28 @ 01:12) (74 - 84)  BP: 127/85 (08-28 @ 01:12) (111/64 - 129/81)  RR: 18 (08-28 @ 01:12) (18 - 18)  SpO2: 93% (08-28 @ 01:12) (93% - 96%)  08-26-22 @ 07:01  -  08-27-22 @ 07:00  --------------------------------------------------------  IN:  Total IN: 0 mL    OUT:    Intermittent Catheterization - Urethral (mL): 1350 mL    Voided (mL): 800 mL  Total OUT: 2150 mL    Total NET: -2150 mL      08-27-22 @ 07:01  -  08-28-22 @ 04:44  --------------------------------------------------------  IN:  Total IN: 0 mL    OUT:    Indwelling Catheter - Urethral (mL): 1550 mL    Voided (mL): 625 mL  Total OUT: 2175 mL    Total NET: -2175 mL          Physical Exam:  GEN: resting in bed comfortably in NAD  RESP: no increased WOB  ABD: soft, mild distension, nontender to superficial and deep palpation   EXTR: warm, well-perfused without gross deformities; spontaneous movement in b/l U/L extrem  NEURO: AAOx4    Labs:                        13.1   5.99  )-----------( 124      ( 27 Aug 2022 07:57 )             39.8   08-27    139  |  105  |  12  ----------------------------<  104<H>  3.8   |  24  |  0.76    Ca    9.0      27 Aug 2022 07:53  Phos  3.1     08-27  Mg     2.0     08-27      CAPILLARY BLOOD GLUCOSE          Medications:   MEDICATIONS  (STANDING):  aspirin  chewable 81 milliGRAM(s) Oral daily  atorvastatin 40 milliGRAM(s) Oral at bedtime  clopidogrel Tablet 75 milliGRAM(s) Oral daily  enoxaparin Injectable 40 milliGRAM(s) SubCutaneous every 24 hours  famotidine    Tablet 40 milliGRAM(s) Oral daily  metoprolol succinate ER 25 milliGRAM(s) Oral daily  pantoprazole    Tablet 40 milliGRAM(s) Oral before breakfast  sodium chloride 0.9% with potassium chloride 20 mEq/L 1000 milliLiter(s) (125 mL/Hr) IV Continuous <Continuous>  tamsulosin 0.4 milliGRAM(s) Oral at bedtime  tenofovir alafenamide (VEMLIDY) 25 milliGRAM(s) Oral daily    MEDICATIONS  (PRN):  benzocaine 15 mG/menthol 3.6 mG Lozenge 1 Lozenge Oral every 6 hours PRN Sore Throat  oxyCODONE    IR 2.5 milliGRAM(s) Oral every 4 hours PRN Moderate Pain (4 - 6)  oxyCODONE    IR 5 milliGRAM(s) Oral every 4 hours PRN Severe Pain (7 - 10)      Imaging:

## 2022-08-29 LAB
ANION GAP SERPL CALC-SCNC: 12 MMOL/L — SIGNIFICANT CHANGE UP (ref 5–17)
APTT BLD: 31.1 SEC — SIGNIFICANT CHANGE UP (ref 27.5–35.5)
BUN SERPL-MCNC: 11 MG/DL — SIGNIFICANT CHANGE UP (ref 7–23)
CALCIUM SERPL-MCNC: 9.6 MG/DL — SIGNIFICANT CHANGE UP (ref 8.4–10.5)
CHLORIDE SERPL-SCNC: 106 MMOL/L — SIGNIFICANT CHANGE UP (ref 96–108)
CO2 SERPL-SCNC: 20 MMOL/L — LOW (ref 22–31)
CREAT SERPL-MCNC: 0.83 MG/DL — SIGNIFICANT CHANGE UP (ref 0.5–1.3)
EGFR: 101 ML/MIN/1.73M2 — SIGNIFICANT CHANGE UP
GLUCOSE SERPL-MCNC: 98 MG/DL — SIGNIFICANT CHANGE UP (ref 70–99)
HCT VFR BLD CALC: 39 % — SIGNIFICANT CHANGE UP (ref 39–50)
HGB BLD-MCNC: 13.2 G/DL — SIGNIFICANT CHANGE UP (ref 13–17)
INR BLD: 1.13 RATIO — SIGNIFICANT CHANGE UP (ref 0.88–1.16)
MAGNESIUM SERPL-MCNC: 1.8 MG/DL — SIGNIFICANT CHANGE UP (ref 1.6–2.6)
MCHC RBC-ENTMCNC: 32.3 PG — SIGNIFICANT CHANGE UP (ref 27–34)
MCHC RBC-ENTMCNC: 33.8 GM/DL — SIGNIFICANT CHANGE UP (ref 32–36)
MCV RBC AUTO: 95.4 FL — SIGNIFICANT CHANGE UP (ref 80–100)
NRBC # BLD: 0 /100 WBCS — SIGNIFICANT CHANGE UP (ref 0–0)
PHOSPHATE SERPL-MCNC: 2.4 MG/DL — LOW (ref 2.5–4.5)
PLATELET # BLD AUTO: 131 K/UL — LOW (ref 150–400)
POTASSIUM SERPL-MCNC: 4.2 MMOL/L — SIGNIFICANT CHANGE UP (ref 3.5–5.3)
POTASSIUM SERPL-SCNC: 4.2 MMOL/L — SIGNIFICANT CHANGE UP (ref 3.5–5.3)
PROTHROM AB SERPL-ACNC: 13.1 SEC — SIGNIFICANT CHANGE UP (ref 10.5–13.4)
RBC # BLD: 4.09 M/UL — LOW (ref 4.2–5.8)
RBC # FLD: 12.2 % — SIGNIFICANT CHANGE UP (ref 10.3–14.5)
SODIUM SERPL-SCNC: 138 MMOL/L — SIGNIFICANT CHANGE UP (ref 135–145)
WBC # BLD: 4.56 K/UL — SIGNIFICANT CHANGE UP (ref 3.8–10.5)
WBC # FLD AUTO: 4.56 K/UL — SIGNIFICANT CHANGE UP (ref 3.8–10.5)

## 2022-08-29 PROCEDURE — 99232 SBSQ HOSP IP/OBS MODERATE 35: CPT | Mod: GC

## 2022-08-29 PROCEDURE — 93454 CORONARY ARTERY ANGIO S&I: CPT | Mod: 26

## 2022-08-29 PROCEDURE — 99152 MOD SED SAME PHYS/QHP 5/>YRS: CPT

## 2022-08-29 RX ORDER — SODIUM CHLORIDE 9 MG/ML
250 INJECTION INTRAMUSCULAR; INTRAVENOUS; SUBCUTANEOUS ONCE
Refills: 0 | Status: COMPLETED | OUTPATIENT
Start: 2022-08-29 | End: 2022-08-29

## 2022-08-29 RX ORDER — SODIUM CHLORIDE 9 MG/ML
1000 INJECTION INTRAMUSCULAR; INTRAVENOUS; SUBCUTANEOUS
Refills: 0 | Status: DISCONTINUED | OUTPATIENT
Start: 2022-08-29 | End: 2022-08-29

## 2022-08-29 RX ORDER — MAGNESIUM SULFATE 500 MG/ML
2 VIAL (ML) INJECTION ONCE
Refills: 0 | Status: COMPLETED | OUTPATIENT
Start: 2022-08-29 | End: 2022-08-29

## 2022-08-29 RX ADMIN — FAMOTIDINE 40 MILLIGRAM(S): 10 INJECTION INTRAVENOUS at 11:49

## 2022-08-29 RX ADMIN — PANTOPRAZOLE SODIUM 40 MILLIGRAM(S): 20 TABLET, DELAYED RELEASE ORAL at 05:53

## 2022-08-29 RX ADMIN — SODIUM CHLORIDE 250 MILLILITER(S): 9 INJECTION INTRAMUSCULAR; INTRAVENOUS; SUBCUTANEOUS at 16:26

## 2022-08-29 RX ADMIN — Medication 25 MILLIGRAM(S): at 05:53

## 2022-08-29 RX ADMIN — Medication 81 MILLIGRAM(S): at 11:49

## 2022-08-29 RX ADMIN — Medication 63.75 MILLIMOLE(S): at 12:24

## 2022-08-29 RX ADMIN — TAMSULOSIN HYDROCHLORIDE 0.4 MILLIGRAM(S): 0.4 CAPSULE ORAL at 20:45

## 2022-08-29 RX ADMIN — Medication 25 GRAM(S): at 09:47

## 2022-08-29 RX ADMIN — TENOFOVIR DISOPROXIL FUMARATE 25 MILLIGRAM(S): 300 TABLET, FILM COATED ORAL at 11:49

## 2022-08-29 RX ADMIN — ATORVASTATIN CALCIUM 40 MILLIGRAM(S): 80 TABLET, FILM COATED ORAL at 20:45

## 2022-08-29 RX ADMIN — CLOPIDOGREL BISULFATE 75 MILLIGRAM(S): 75 TABLET, FILM COATED ORAL at 11:49

## 2022-08-29 NOTE — PROGRESS NOTE ADULT - ASSESSMENT
57 y M s/p Robotic Laparoscopic Assist Presacral Cyst Excision    - f/u cards: diagnostic cath today  - Plavix 75 QD, ASA 81 QD per cardiology recs.   - Diet: Regular  - Activity- OOB with assistance  - Labs: am labs  - Pain medication as needed   - DVT ppx  - Incentive spirometry    Red Team [id number] 57 y M s/p Robotic Laparoscopic Assist Presacral Cyst Excision    - NPO  - f/u cards: diagnostic cath today  - Plavix 75 QD, ASA 81 QD per cardiology recs.   - Diet: Regular  - Activity- OOB with assistance  - Labs: am labs  - Pain medication as needed   - DVT ppx  - Incentive spirometry    Red Team [id number]

## 2022-08-29 NOTE — PROGRESS NOTE ADULT - SUBJECTIVE AND OBJECTIVE BOX
Patient seen and examined at bedside.    Overnight Events: No acute events overnight. Denies chest pain or SOB      REVIEW OF SYSTEMS:  Constitutional:     [ x] negative [ ] fevers [ ] chills [ ] weight loss [ ] weight gain  HEENT:                  [ x] negative [ ] dry eyes [ ] eye irritation [ ] postnasal drip [ ] nasal congestion  CV:                         [x ] negative  [ ] chest pain [ ] orthopnea [ ] palpitations [ ] murmur  Resp:                     [ x] negative [ ] cough [ ] shortness of breath [ ] dyspnea [ ] wheezing [ ] sputum [ ]hemoptysis  GI:                          [ x] negative [ ] nausea [ ] vomiting [ ] diarrhea [ ] constipation [ ] abd pain [ ] dysphagia   :                        [ x] negative [ ] dysuria [ ] nocturia [ ] hematuria [ ] increased urinary frequency  Musculoskeletal: [ x] negative [ ] back pain [ ] myalgias [ ] arthralgias [ ] fracture  Skin:                       [ x] negative [ ] rash [ ] itch  Neurological:        [ x] negative [ ] headache [ ] dizziness [ ] syncope [ ] weakness [ ] numbness  Psychiatric:           [ x] negative [ ] anxiety [ ] depression  Endocrine:            [ x] negative [ ] diabetes [ ] thyroid problem  Heme/Lymph:      [ x] negative [ ] anemia [ ] bleeding problem  Allergic/Immune: [x ] negative [ ] itchy eyes [ ] nasal discharge [ ] hives [ ] angioedema    [x ] All other systems negative  [ ] Unable to assess ROS due to    Current Meds:  aspirin  chewable 81 milliGRAM(s) Oral daily  atorvastatin 40 milliGRAM(s) Oral at bedtime  benzocaine 15 mG/menthol 3.6 mG Lozenge 1 Lozenge Oral every 6 hours PRN  clopidogrel Tablet 75 milliGRAM(s) Oral daily  enoxaparin Injectable 40 milliGRAM(s) SubCutaneous every 24 hours  famotidine    Tablet 40 milliGRAM(s) Oral daily  metoprolol succinate ER 25 milliGRAM(s) Oral daily  oxyCODONE    IR 2.5 milliGRAM(s) Oral every 4 hours PRN  oxyCODONE    IR 5 milliGRAM(s) Oral every 4 hours PRN  pantoprazole    Tablet 40 milliGRAM(s) Oral before breakfast  sodium chloride 0.9% with potassium chloride 20 mEq/L 1000 milliLiter(s) IV Continuous <Continuous>  tamsulosin 0.4 milliGRAM(s) Oral at bedtime  tenofovir alafenamide (VEMLIDY) 25 milliGRAM(s) Oral daily      PAST MEDICAL & SURGICAL HISTORY:  Hepatitis B  on Vemlidy      Hyperparathyroidism, unspecified      Benign lipomatous neoplasm of skin and subcutaneous tissue of head, face and neck      Obesity  BMI 35.7      GERD (gastroesophageal reflux disease)      History of parathyroidectomy      History of varicose vein stripping  laser          Vitals:  T(F): 97.8 (08-29), Max: 98.8 (08-28)  HR: 64 (08-29) (64 - 75)  BP: 131/85 (08-29) (97/61 - 131/85)  RR: 18 (08-29)  SpO2: 96% (08-29)  I&O's Summary    28 Aug 2022 07:01  -  29 Aug 2022 07:00  --------------------------------------------------------  IN: 4085 mL / OUT: 4250 mL / NET: -165 mL        Physical Exam:  Appearance: No acute distress; well appearing  Eyes: PERRL, EOMI, pink conjunctiva  HENT: Normal oral mucosa  Cardiovascular: RRR, S1, S2, no murmurs, rubs, or gallops; no edema; no JVD  Respiratory: Clear to auscultation bilaterally  Gastrointestinal: soft, non-tender, non-distended with normal bowel sounds  Musculoskeletal: No clubbing; no joint deformity   Neurologic: Non-focal  Lymphatic: No lymphadenopathy  Psychiatry: AAOx3, mood & affect appropriate  Skin: No rashes, ecchymoses, or cyanosis                          13.2   4.56  )-----------( 131      ( 29 Aug 2022 07:43 )             39.0     08-28    139  |  106  |  9   ----------------------------<  110<H>  3.9   |  23  |  0.71    Ca    9.5      28 Aug 2022 08:23  Phos  2.3     08-28  Mg     2.1     08-28    TPro  6.7  /  Alb  4.0  /  TBili  0.7  /  DBili  0.1  /  AST  22  /  ALT  16  /  AlkPhos  49  08-28    PT/INR - ( 29 Aug 2022 07:44 )   PT: 13.1 sec;   INR: 1.13 ratio         PTT - ( 29 Aug 2022 07:44 )  PTT:31.1 sec

## 2022-08-29 NOTE — PROGRESS NOTE ADULT - ATTENDING COMMENTS
Agree with plan as outlined above.  Concerning CP and stress for CAD  Plan for [facility]                                                                       Forty-1 Minutes Spent in Patient Management
Agree with plan as outlined above.  Plan for [facility] tomorrow/Mon                                                                      Forty-1 Minutes Spent in Patient Management
no abd pain  astrid diet    aaox3  abd soft, incisions c/d/i    cardiac cath tomorrow per cards
The patient was seen and examined with the Cardiology Consultation Teaching Service.     No overnight events    No chest pain  No dyspnea, orthopnea or PND  No palpitations or dizziness     Comfortable-appearing man in no acute distress  Alert and oriented  Afebrile  Vital signs stable  JVP is not elevated  Clear lungs  Normal heart sounds  Extremities are warm and perfused  No peripheral edema     Stable thrombocytopenia 131K      hs-troponin undetectable    Stress testing with myocardial perfusion imaging demonstrated large, moderate defects in the inferior, inferoapical, apical lateral, basal septal, and basal inferolateral walls that are partially reversible suggestive of infarct with at least moderate savanah-infarct ischemia.    Impression and Recommendations:   57-year-old man who experienced post-operative chest pain after robotic laparoscopic pelvic mass excision.     Stress testing was abnormal as noted above.   Coronary angiography is planned today.     Continue dual antiplatelet therapy with aspirin and clopidogrel.  Continue high-potency statin and beta-blocker.    Flavio Cuenca MD  Cardiology  [phone]
diet as astrid  cath tomorrow

## 2022-08-29 NOTE — PROGRESS NOTE ADULT - SUBJECTIVE AND OBJECTIVE BOX
RED Surgery Progress Note    SUBJECTIVE:   Patient seen and examined at bedside with surgical team.   No acute events overnight.     OBJECTIVE:    Vital Signs Last 24 Hrs  T(C): 36.8 (29 Aug 2022 01:02), Max: 37.1 (28 Aug 2022 14:13)  T(F): 98.2 (29 Aug 2022 01:02), Max: 98.8 (28 Aug 2022 14:13)  HR: 72 (29 Aug 2022 01:02) (71 - 78)  BP: 116/77 (29 Aug 2022 01:02) (97/61 - 143/95)  BP(mean): --  RR: 18 (29 Aug 2022 01:02) (18 - 18)  SpO2: 96% (29 Aug 2022 01:02) (94% - 96%)    Parameters below as of 29 Aug 2022 01:02  Patient On (Oxygen Delivery Method): room air        MEDICATIONS  (STANDING):  aspirin  chewable 81 milliGRAM(s) Oral daily  atorvastatin 40 milliGRAM(s) Oral at bedtime  clopidogrel Tablet 75 milliGRAM(s) Oral daily  enoxaparin Injectable 40 milliGRAM(s) SubCutaneous every 24 hours  famotidine    Tablet 40 milliGRAM(s) Oral daily  metoprolol succinate ER 25 milliGRAM(s) Oral daily  pantoprazole    Tablet 40 milliGRAM(s) Oral before breakfast  sodium chloride 0.9% with potassium chloride 20 mEq/L 1000 milliLiter(s) (125 mL/Hr) IV Continuous <Continuous>  tamsulosin 0.4 milliGRAM(s) Oral at bedtime  tenofovir alafenamide (VEMLIDY) 25 milliGRAM(s) Oral daily    MEDICATIONS  (PRN):  benzocaine 15 mG/menthol 3.6 mG Lozenge 1 Lozenge Oral every 6 hours PRN Sore Throat  oxyCODONE    IR 2.5 milliGRAM(s) Oral every 4 hours PRN Moderate Pain (4 - 6)  oxyCODONE    IR 5 milliGRAM(s) Oral every 4 hours PRN Severe Pain (7 - 10)      PHYSICAL EXAM:  Constitutional:  NAD  Respiratory: Unlabored breathing  Abdomen: Soft, nondistended, NTTP. No rebound or guarding.  Extremities: WWP, RODRIGUEZ spontaneously    LABS:                        12.8   4.93  )-----------( 127      ( 28 Aug 2022 07:17 )             38.2     08-28    139  |  106  |  9   ----------------------------<  110<H>  3.9   |  23  |  0.71    Ca    9.5      28 Aug 2022 08:23  Phos  2.3     08-28  Mg     2.1     08-28    TPro  6.7  /  Alb  4.0  /  TBili  0.7  /  DBili  0.1  /  AST  22  /  ALT  16  /  AlkPhos  49  08-28    PT/INR - ( 28 Aug 2022 07:19 )   PT: 13.4 sec;   INR: 1.16 ratio         PTT - ( 28 Aug 2022 07:19 )  PTT:29.8 sec  LIVER FUNCTIONS - ( 28 Aug 2022 08:23 )  Alb: 4.0 g/dL / Pro: 6.7 g/dL / ALK PHOS: 49 U/L / ALT: 16 U/L / AST: 22 U/L / GGT: x                RED Surgery Progress Note    SUBJECTIVE:   Patient seen and examined at bedside with surgical team.   No acute events overnight. Pt is passing gas. Pt denies bowel movement.     OBJECTIVE:    Vital Signs Last 24 Hrs  T(C): 36.8 (29 Aug 2022 01:02), Max: 37.1 (28 Aug 2022 14:13)  T(F): 98.2 (29 Aug 2022 01:02), Max: 98.8 (28 Aug 2022 14:13)  HR: 72 (29 Aug 2022 01:02) (71 - 78)  BP: 116/77 (29 Aug 2022 01:02) (97/61 - 143/95)  BP(mean): --  RR: 18 (29 Aug 2022 01:02) (18 - 18)  SpO2: 96% (29 Aug 2022 01:02) (94% - 96%)    Parameters below as of 29 Aug 2022 01:02  Patient On (Oxygen Delivery Method): room air        MEDICATIONS  (STANDING):  aspirin  chewable 81 milliGRAM(s) Oral daily  atorvastatin 40 milliGRAM(s) Oral at bedtime  clopidogrel Tablet 75 milliGRAM(s) Oral daily  enoxaparin Injectable 40 milliGRAM(s) SubCutaneous every 24 hours  famotidine    Tablet 40 milliGRAM(s) Oral daily  metoprolol succinate ER 25 milliGRAM(s) Oral daily  pantoprazole    Tablet 40 milliGRAM(s) Oral before breakfast  sodium chloride 0.9% with potassium chloride 20 mEq/L 1000 milliLiter(s) (125 mL/Hr) IV Continuous <Continuous>  tamsulosin 0.4 milliGRAM(s) Oral at bedtime  tenofovir alafenamide (VEMLIDY) 25 milliGRAM(s) Oral daily    MEDICATIONS  (PRN):  benzocaine 15 mG/menthol 3.6 mG Lozenge 1 Lozenge Oral every 6 hours PRN Sore Throat  oxyCODONE    IR 2.5 milliGRAM(s) Oral every 4 hours PRN Moderate Pain (4 - 6)  oxyCODONE    IR 5 milliGRAM(s) Oral every 4 hours PRN Severe Pain (7 - 10)      PHYSICAL EXAM:  Constitutional:  NAD  Respiratory: Unlabored breathing  Abdomen: Soft, nondistended, NTTP. No rebound or guarding.  Extremities: WWP, RODRIGUEZ spontaneously    LABS:                        12.8   4.93  )-----------( 127      ( 28 Aug 2022 07:17 )             38.2     08-28    139  |  106  |  9   ----------------------------<  110<H>  3.9   |  23  |  0.71    Ca    9.5      28 Aug 2022 08:23  Phos  2.3     08-28  Mg     2.1     08-28    TPro  6.7  /  Alb  4.0  /  TBili  0.7  /  DBili  0.1  /  AST  22  /  ALT  16  /  AlkPhos  49  08-28    PT/INR - ( 28 Aug 2022 07:19 )   PT: 13.4 sec;   INR: 1.16 ratio         PTT - ( 28 Aug 2022 07:19 )  PTT:29.8 sec  LIVER FUNCTIONS - ( 28 Aug 2022 08:23 )  Alb: 4.0 g/dL / Pro: 6.7 g/dL / ALK PHOS: 49 U/L / ALT: 16 U/L / AST: 22 U/L / GGT: x

## 2022-08-29 NOTE — PROGRESS NOTE ADULT - ASSESSMENT
58 y/o male with PMH of hyperparathyroidoism, hepatitis B on vemlidy, and GERD who is s/p robotic laparoscopic  pelvic mass excision on 8/25/22 for a presacral mass. Cardiology was consulted due to post-procedural chest pain with EKG changes. Chest pain now resolved.     #Chest pain   - The patient's chest pain does have some features concerning for unstable angina   - Pharm stress test showed: "large, moderate defects in the inferior,  inferoapical, apical lateral, basal septal, and basal  inferolateral walls that are partially reversible  suggestive of infarct with at least moderate savanah-infarct  ischemia"   - Please obtain a repeat EKG and cardiac biomarkers for any chest pain recurrence   - Given patient's high bleeding risk post-surgery and recent Aspirin load, will avoid Heparin drip   - Continue aspirin 81mg and start plavix 75mg once daily   - Continue metoprolol 25mg and atorvastatin 40mg   - Plan for cath today, please keep LONDON Brizuela, Cardiology Fellow, F-1 58 y/o male with PMH of hyperparathyroidoism, hepatitis B on vemlidy, and GERD who is s/p robotic laparoscopic  pelvic mass excision on 8/25/22 for a presacral mass. Cardiology was consulted due to post-procedural chest pain with EKG changes. Chest pain now resolved.     #Chest pain   - The patient's chest pain does have some features concerning for unstable angina   - Pharm stress test showed: "large, moderate defects in the inferior,  inferoapical, apical lateral, basal septal, and basal  inferolateral walls that are partially reversible  suggestive of infarct with at least moderate savanah-infarct  ischemia"   - Please obtain a repeat EKG and cardiac biomarkers for any chest pain recurrence   - Given patient's high bleeding risk post-surgery and recent Aspirin load, will avoid Heparin drip   - Continue aspirin 81mg and start plavix 75mg once daily   - Continue metoprolol 25mg and atorvastatin 40mg   - Plan for cath today, please keep NPO    -Pt to followup with Dr. Dada Alejandra (cardiology) after discharge. [phone]. [phone] [place] [id number], [address]    [place] Brizuela, Cardiology Fellow, F-1

## 2022-08-30 ENCOUNTER — TRANSCRIPTION ENCOUNTER (OUTPATIENT)
Age: 57
End: 2022-08-30

## 2022-08-30 VITALS
OXYGEN SATURATION: 95 % | HEART RATE: 73 BPM | DIASTOLIC BLOOD PRESSURE: 77 MMHG | TEMPERATURE: 98 F | RESPIRATION RATE: 18 BRPM | SYSTOLIC BLOOD PRESSURE: 129 MMHG

## 2022-08-30 LAB
ANION GAP SERPL CALC-SCNC: 16 MMOL/L — SIGNIFICANT CHANGE UP (ref 5–17)
BUN SERPL-MCNC: 10 MG/DL — SIGNIFICANT CHANGE UP (ref 7–23)
CALCIUM SERPL-MCNC: 9.5 MG/DL — SIGNIFICANT CHANGE UP (ref 8.4–10.5)
CHLORIDE SERPL-SCNC: 107 MMOL/L — SIGNIFICANT CHANGE UP (ref 96–108)
CO2 SERPL-SCNC: 18 MMOL/L — LOW (ref 22–31)
CREAT SERPL-MCNC: 0.7 MG/DL — SIGNIFICANT CHANGE UP (ref 0.5–1.3)
EGFR: 106 ML/MIN/1.73M2 — SIGNIFICANT CHANGE UP
GLUCOSE SERPL-MCNC: 95 MG/DL — SIGNIFICANT CHANGE UP (ref 70–99)
HCT VFR BLD CALC: 38.2 % — LOW (ref 39–50)
HGB BLD-MCNC: 13 G/DL — SIGNIFICANT CHANGE UP (ref 13–17)
MAGNESIUM SERPL-MCNC: 2.1 MG/DL — SIGNIFICANT CHANGE UP (ref 1.6–2.6)
MCHC RBC-ENTMCNC: 31.9 PG — SIGNIFICANT CHANGE UP (ref 27–34)
MCHC RBC-ENTMCNC: 34 GM/DL — SIGNIFICANT CHANGE UP (ref 32–36)
MCV RBC AUTO: 93.9 FL — SIGNIFICANT CHANGE UP (ref 80–100)
NRBC # BLD: 0 /100 WBCS — SIGNIFICANT CHANGE UP (ref 0–0)
PHOSPHATE SERPL-MCNC: 2.6 MG/DL — SIGNIFICANT CHANGE UP (ref 2.5–4.5)
PLATELET # BLD AUTO: 152 K/UL — SIGNIFICANT CHANGE UP (ref 150–400)
POTASSIUM SERPL-MCNC: 4.2 MMOL/L — SIGNIFICANT CHANGE UP (ref 3.5–5.3)
POTASSIUM SERPL-SCNC: 4.2 MMOL/L — SIGNIFICANT CHANGE UP (ref 3.5–5.3)
RBC # BLD: 4.07 M/UL — LOW (ref 4.2–5.8)
RBC # FLD: 12.1 % — SIGNIFICANT CHANGE UP (ref 10.3–14.5)
SODIUM SERPL-SCNC: 141 MMOL/L — SIGNIFICANT CHANGE UP (ref 135–145)
WBC # BLD: 4.47 K/UL — SIGNIFICANT CHANGE UP (ref 3.8–10.5)
WBC # FLD AUTO: 4.47 K/UL — SIGNIFICANT CHANGE UP (ref 3.8–10.5)

## 2022-08-30 PROCEDURE — C1894: CPT

## 2022-08-30 PROCEDURE — 85730 THROMBOPLASTIN TIME PARTIAL: CPT

## 2022-08-30 PROCEDURE — 84484 ASSAY OF TROPONIN QUANT: CPT

## 2022-08-30 PROCEDURE — C1769: CPT

## 2022-08-30 PROCEDURE — 85610 PROTHROMBIN TIME: CPT

## 2022-08-30 PROCEDURE — 82550 ASSAY OF CK (CPK): CPT

## 2022-08-30 PROCEDURE — A9500: CPT

## 2022-08-30 PROCEDURE — 78452 HT MUSCLE IMAGE SPECT MULT: CPT

## 2022-08-30 PROCEDURE — 80053 COMPREHEN METABOLIC PANEL: CPT

## 2022-08-30 PROCEDURE — 93017 CV STRESS TEST TRACING ONLY: CPT

## 2022-08-30 PROCEDURE — S2900: CPT

## 2022-08-30 PROCEDURE — 86901 BLOOD TYPING SEROLOGIC RH(D): CPT

## 2022-08-30 PROCEDURE — 93306 TTE W/DOPPLER COMPLETE: CPT

## 2022-08-30 PROCEDURE — 82977 ASSAY OF GGT: CPT

## 2022-08-30 PROCEDURE — 97161 PT EVAL LOW COMPLEX 20 MIN: CPT

## 2022-08-30 PROCEDURE — C1889: CPT

## 2022-08-30 PROCEDURE — U0003: CPT

## 2022-08-30 PROCEDURE — 88307 TISSUE EXAM BY PATHOLOGIST: CPT

## 2022-08-30 PROCEDURE — C1887: CPT

## 2022-08-30 PROCEDURE — 83690 ASSAY OF LIPASE: CPT

## 2022-08-30 PROCEDURE — 97530 THERAPEUTIC ACTIVITIES: CPT

## 2022-08-30 PROCEDURE — C9399: CPT

## 2022-08-30 PROCEDURE — 82248 BILIRUBIN DIRECT: CPT

## 2022-08-30 PROCEDURE — 82962 GLUCOSE BLOOD TEST: CPT

## 2022-08-30 PROCEDURE — U0005: CPT

## 2022-08-30 PROCEDURE — 36415 COLL VENOUS BLD VENIPUNCTURE: CPT

## 2022-08-30 PROCEDURE — 82553 CREATINE MB FRACTION: CPT

## 2022-08-30 PROCEDURE — 80048 BASIC METABOLIC PNL TOTAL CA: CPT

## 2022-08-30 PROCEDURE — 86900 BLOOD TYPING SEROLOGIC ABO: CPT

## 2022-08-30 PROCEDURE — 84100 ASSAY OF PHOSPHORUS: CPT

## 2022-08-30 PROCEDURE — 86850 RBC ANTIBODY SCREEN: CPT

## 2022-08-30 PROCEDURE — 97116 GAIT TRAINING THERAPY: CPT

## 2022-08-30 PROCEDURE — 93005 ELECTROCARDIOGRAM TRACING: CPT

## 2022-08-30 PROCEDURE — 85027 COMPLETE CBC AUTOMATED: CPT

## 2022-08-30 PROCEDURE — 83735 ASSAY OF MAGNESIUM: CPT

## 2022-08-30 PROCEDURE — 93454 CORONARY ARTERY ANGIO S&I: CPT

## 2022-08-30 RX ORDER — METOPROLOL TARTRATE 50 MG
1 TABLET ORAL
Qty: 30 | Refills: 0
Start: 2022-08-30 | End: 2022-09-28

## 2022-08-30 RX ORDER — SODIUM,POTASSIUM PHOSPHATES 278-250MG
1 POWDER IN PACKET (EA) ORAL ONCE
Refills: 0 | Status: COMPLETED | OUTPATIENT
Start: 2022-08-30 | End: 2022-08-30

## 2022-08-30 RX ORDER — ATORVASTATIN CALCIUM 80 MG/1
1 TABLET, FILM COATED ORAL
Qty: 30 | Refills: 0
Start: 2022-08-30 | End: 2022-09-28

## 2022-08-30 RX ORDER — OXYCODONE HYDROCHLORIDE 5 MG/1
1 TABLET ORAL
Qty: 3 | Refills: 0
Start: 2022-08-30

## 2022-08-30 RX ADMIN — Medication 25 MILLIGRAM(S): at 05:33

## 2022-08-30 RX ADMIN — FAMOTIDINE 40 MILLIGRAM(S): 10 INJECTION INTRAVENOUS at 11:34

## 2022-08-30 RX ADMIN — ENOXAPARIN SODIUM 40 MILLIGRAM(S): 100 INJECTION SUBCUTANEOUS at 11:34

## 2022-08-30 RX ADMIN — PANTOPRAZOLE SODIUM 40 MILLIGRAM(S): 20 TABLET, DELAYED RELEASE ORAL at 05:33

## 2022-08-30 RX ADMIN — CLOPIDOGREL BISULFATE 75 MILLIGRAM(S): 75 TABLET, FILM COATED ORAL at 11:34

## 2022-08-30 RX ADMIN — TENOFOVIR DISOPROXIL FUMARATE 25 MILLIGRAM(S): 300 TABLET, FILM COATED ORAL at 11:34

## 2022-08-30 RX ADMIN — Medication 81 MILLIGRAM(S): at 11:34

## 2022-08-30 RX ADMIN — DEXTROSE MONOHYDRATE, SODIUM CHLORIDE, AND POTASSIUM CHLORIDE 125 MILLILITER(S): 50; .745; 4.5 INJECTION, SOLUTION INTRAVENOUS at 08:59

## 2022-08-30 RX ADMIN — Medication 1 PACKET(S): at 11:34

## 2022-08-30 NOTE — DISCHARGE NOTE PROVIDER - NSDCMRMEDTOKEN_GEN_ALL_CORE_FT
famotidine 40 mg oral tablet: 1 tab(s) orally once a day (at bedtime)  pantoprazole 40 mg oral delayed release tablet: 1 tab(s) orally once a day  Vemlidy 25 mg oral tablet: 1 tab(s) orally once a day  Vitamin D3 25 mcg (1000 intl units) oral tablet: 1 tab(s) orally once a day   atorvastatin 40 mg oral tablet: 1 tab(s) orally once a day (at bedtime)  famotidine 40 mg oral tablet: 1 tab(s) orally once a day (at bedtime)  metoprolol succinate 25 mg oral tablet, extended release: 1 tab(s) orally once a day  pantoprazole 40 mg oral delayed release tablet: 1 tab(s) orally once a day  Vemlidy 25 mg oral tablet: 1 tab(s) orally once a day  Vitamin D3 25 mcg (1000 intl units) oral tablet: 1 tab(s) orally once a day   atorvastatin 40 mg oral tablet: 1 tab(s) orally once a day (at bedtime)  famotidine 40 mg oral tablet: 1 tab(s) orally once a day (at bedtime)  metoprolol succinate 25 mg oral tablet, extended release: 1 tab(s) orally once a day  oxyCODONE 5 mg oral tablet: 1 tab(s) orally every 6 hours, As Needed -Severe Pain (7 - 10) MDD:4   pantoprazole 40 mg oral delayed release tablet: 1 tab(s) orally once a day  Vemlidy 25 mg oral tablet: 1 tab(s) orally once a day  Vitamin D3 25 mcg (1000 intl units) oral tablet: 1 tab(s) orally once a day

## 2022-08-30 NOTE — CHART NOTE - NSCHARTNOTEFT_GEN_A_CORE
Spoke with Cards fellow, Dr. Brizuela: cath yesterday 8/29 showed clean coronaries and some mild dysfunction, no further workup necessary" Patient does not require dual-antiplatelets, ok to send home on metoprolol and statin. Patient is clear for dc and will followup with Dr. Alejandra within 1-2 weeks upon discharge.      Ciera Sol PA-C  Red Surgery [id number]

## 2022-08-30 NOTE — PROGRESS NOTE ADULT - SUBJECTIVE AND OBJECTIVE BOX
RED Surgery Progress Note    SUBJECTIVE:   Patient seen and examined at bedside with surgical team. s/p cardiac cath w/o stent placement.  No acute events overnight. Portillo removed overnight.     OBJECTIVE:    Vital Signs Last 24 Hrs  T(C): 36.5 (30 Aug 2022 01:05), Max: 36.7 (29 Aug 2022 10:37)  T(F): 97.7 (30 Aug 2022 01:05), Max: 98 (29 Aug 2022 10:37)  HR: 73 (30 Aug 2022 01:05) (64 - 88)  BP: 116/78 (30 Aug 2022 01:05) (109/71 - 161/99)  BP(mean): --  RR: 18 (30 Aug 2022 01:05) (17 - 18)  SpO2: 92% (30 Aug 2022 01:05) (92% - 99%)    Parameters below as of 30 Aug 2022 01:05  Patient On (Oxygen Delivery Method): room air        MEDICATIONS  (STANDING):  aspirin  chewable 81 milliGRAM(s) Oral daily  atorvastatin 40 milliGRAM(s) Oral at bedtime  clopidogrel Tablet 75 milliGRAM(s) Oral daily  enoxaparin Injectable 40 milliGRAM(s) SubCutaneous every 24 hours  famotidine    Tablet 40 milliGRAM(s) Oral daily  metoprolol succinate ER 25 milliGRAM(s) Oral daily  pantoprazole    Tablet 40 milliGRAM(s) Oral before breakfast  sodium chloride 0.9% with potassium chloride 20 mEq/L 1000 milliLiter(s) (125 mL/Hr) IV Continuous <Continuous>  tamsulosin 0.4 milliGRAM(s) Oral at bedtime  tenofovir alafenamide (VEMLIDY) 25 milliGRAM(s) Oral daily    MEDICATIONS  (PRN):  benzocaine 15 mG/menthol 3.6 mG Lozenge 1 Lozenge Oral every 6 hours PRN Sore Throat  oxyCODONE    IR 2.5 milliGRAM(s) Oral every 4 hours PRN Moderate Pain (4 - 6)  oxyCODONE    IR 5 milliGRAM(s) Oral every 4 hours PRN Severe Pain (7 - 10)      PHYSICAL EXAM:  Constitutional:  NAD  Respiratory: Unlabored breathing  Abdomen: Soft, nondistended, NTTP. No rebound or guarding.  Extremities: WWP, RODRIGUEZ spontaneously    LABS:                        13.2   4.56  )-----------( 131      ( 29 Aug 2022 07:43 )             39.0     08-29    138  |  106  |  11  ----------------------------<  98  4.2   |  20<L>  |  0.83    Ca    9.6      29 Aug 2022 07:46  Phos  2.4     08-29  Mg     1.8     08-29    TPro  6.7  /  Alb  4.0  /  TBili  0.7  /  DBili  0.1  /  AST  22  /  ALT  16  /  AlkPhos  49  08-28    PT/INR - ( 29 Aug 2022 07:44 )   PT: 13.1 sec;   INR: 1.13 ratio         PTT - ( 29 Aug 2022 07:44 )  PTT:31.1 sec  LIVER FUNCTIONS - ( 28 Aug 2022 08:23 )  Alb: 4.0 g/dL / Pro: 6.7 g/dL / ALK PHOS: 49 U/L / ALT: 16 U/L / AST: 22 U/L / GGT: x                RED Surgery Progress Note    SUBJECTIVE:   Patient seen and examined at bedside with surgical team. s/p cardiac cath w/o stent placement.  No acute events overnight. Portillo removed overnight and pt has voided.     OBJECTIVE:    Vital Signs Last 24 Hrs  T(C): 36.5 (30 Aug 2022 01:05), Max: 36.7 (29 Aug 2022 10:37)  T(F): 97.7 (30 Aug 2022 01:05), Max: 98 (29 Aug 2022 10:37)  HR: 73 (30 Aug 2022 01:05) (64 - 88)  BP: 116/78 (30 Aug 2022 01:05) (109/71 - 161/99)  BP(mean): --  RR: 18 (30 Aug 2022 01:05) (17 - 18)  SpO2: 92% (30 Aug 2022 01:05) (92% - 99%)    Parameters below as of 30 Aug 2022 01:05  Patient On (Oxygen Delivery Method): room air        MEDICATIONS  (STANDING):  aspirin  chewable 81 milliGRAM(s) Oral daily  atorvastatin 40 milliGRAM(s) Oral at bedtime  clopidogrel Tablet 75 milliGRAM(s) Oral daily  enoxaparin Injectable 40 milliGRAM(s) SubCutaneous every 24 hours  famotidine    Tablet 40 milliGRAM(s) Oral daily  metoprolol succinate ER 25 milliGRAM(s) Oral daily  pantoprazole    Tablet 40 milliGRAM(s) Oral before breakfast  sodium chloride 0.9% with potassium chloride 20 mEq/L 1000 milliLiter(s) (125 mL/Hr) IV Continuous <Continuous>  tamsulosin 0.4 milliGRAM(s) Oral at bedtime  tenofovir alafenamide (VEMLIDY) 25 milliGRAM(s) Oral daily    MEDICATIONS  (PRN):  benzocaine 15 mG/menthol 3.6 mG Lozenge 1 Lozenge Oral every 6 hours PRN Sore Throat  oxyCODONE    IR 2.5 milliGRAM(s) Oral every 4 hours PRN Moderate Pain (4 - 6)  oxyCODONE    IR 5 milliGRAM(s) Oral every 4 hours PRN Severe Pain (7 - 10)      PHYSICAL EXAM:  Constitutional:  NAD  Respiratory: Unlabored breathing  Abdomen: Soft, nondistended, NTTP. No rebound or guarding.  Extremities: WWP, RODRIGUEZ spontaneously    LABS:                        13.2   4.56  )-----------( 131      ( 29 Aug 2022 07:43 )             39.0     08-29    138  |  106  |  11  ----------------------------<  98  4.2   |  20<L>  |  0.83    Ca    9.6      29 Aug 2022 07:46  Phos  2.4     08-29  Mg     1.8     08-29    TPro  6.7  /  Alb  4.0  /  TBili  0.7  /  DBili  0.1  /  AST  22  /  ALT  16  /  AlkPhos  49  08-28    PT/INR - ( 29 Aug 2022 07:44 )   PT: 13.1 sec;   INR: 1.13 ratio         PTT - ( 29 Aug 2022 07:44 )  PTT:31.1 sec  LIVER FUNCTIONS - ( 28 Aug 2022 08:23 )  Alb: 4.0 g/dL / Pro: 6.7 g/dL / ALK PHOS: 49 U/L / ALT: 16 U/L / AST: 22 U/L / GGT: x

## 2022-08-30 NOTE — PROGRESS NOTE ADULT - ASSESSMENT
57 y M s/p Robotic Laparoscopic Assist Presacral Cyst Excision now s/p cardiac cath w/o stent placement on 8/29    - NPO  - f/u cards: no stent placed, recommended medical management  - Plavix 75 QD, ASA 81 QD per cardiology recs.   - Diet: Regular  - Activity- OOB with assistance  - Labs: am labs  - Pain medication as needed   - DVT ppx  - Incentive spirometry    Red Team [id number]

## 2022-08-30 NOTE — DISCHARGE NOTE PROVIDER - PROVIDER TOKENS
PROVIDER:[TOKEN:[[id number]]],PROVIDER:[TOKEN:[[id number]]] PROVIDER:[TOKEN:[[id number]]],PROVIDER:[TOKEN:[[id number]]],PROVIDER:[TOKEN:[[id number]]]

## 2022-08-30 NOTE — DISCHARGE NOTE PROVIDER - CARE PROVIDERS DIRECT ADDRESSES
,DirectAddress_Unknown,[email or web address] ,DirectAddress_Unknown,[email or web address],DirectAddress_Unknown

## 2022-08-30 NOTE — DISCHARGE NOTE PROVIDER - CARE PROVIDER_API CALL
Imelda Sam)  Surgery  [address] [facility], [address]  [place] [address]  Phone: [phone]  Fax: [phone]  Follow Up Time:     Flavio Cuenca)  Cardiology; Internal Medicine  [address] [place] [address], [address] [address]  Phone: [phone]  Fax: [phone]  Follow Up Time:    Imelda Sam)  Surgery  [address] [facility], [address]  [place] [address]  Phone: [phone]  Fax: [phone]  Follow Up Time:     Flavio Cuenca)  Cardiology; Internal Medicine  [address] [place] [address], [address] [place] [address]  Phone: [phone]  Fax: [phone]  Follow Up Time:     Dada Alejandra)  Internal Medicine  [address] [place] [address]  Phone: [phone]  Fax: [phone]  Follow Up Time:

## 2022-08-30 NOTE — DISCHARGE NOTE PROVIDER - NSDCCPCAREPLAN_GEN_ALL_CORE_FT
PRINCIPAL DISCHARGE DIAGNOSIS  Diagnosis: Other intra-abdominal and pelvic swelling, mass and lump  Assessment and Plan of Treatment:       SECONDARY DISCHARGE DIAGNOSES  Diagnosis: Chest pain  Assessment and Plan of Treatment:      PRINCIPAL DISCHARGE DIAGNOSIS  Diagnosis: Other intra-abdominal and pelvic swelling, mass and lump  Assessment and Plan of Treatment: WOUND CARE: You may shower. Pat Dry abdomen. Leave the white steri strips in place, they will fall off on their own in approximately 5-7 days.    BATHING: Please do not submerge wound underwater. You may shower and/or sponge bathe.  ACTIVITY: No heavy lifting anything more than 10-15lbs or straining. Otherwise, you may return to your usual level of physical activity. If you are taking narcotic pain medication (such as Percocet), do NOT drive a car, operate machinery or make important decisions.  DIET: Low fiber diet  NOTIFY YOUR SURGEON IF: You have any bleeding that does not stop, any pus draining from your wound, any fever (over 100.4 F) or chills, persistent nausea/vomiting with inability to tolerate food or liquids, persistent diarrhea, or if your pain is not controlled on your discharge pain medications.  FOLLOW-UP:  1. Please call to make a follow-up appointment within one week of discharge with Dr. Sam.  2. Please follow up with your primary care physician in one week regarding your hospitalization.      SECONDARY DISCHARGE DIAGNOSES  Diagnosis: Chest pain  Assessment and Plan of Treatment:      PRINCIPAL DISCHARGE DIAGNOSIS  Diagnosis: Other intra-abdominal and pelvic swelling, mass and lump  Assessment and Plan of Treatment: WOUND CARE: You may shower. Pat Dry abdomen. Leave the white steri strips in place, they will fall off on their own in approximately 5-7 days.    BATHING: Please do not submerge wound underwater. You may shower and/or sponge bathe.  ACTIVITY: No heavy lifting anything more than 10-15lbs or straining. Otherwise, you may return to your usual level of physical activity. If you are taking narcotic pain medication (such as Percocet), do NOT drive a car, operate machinery or make important decisions.  DIET: Low fiber diet  NOTIFY YOUR SURGEON IF: You have any bleeding that does not stop, any pus draining from your wound, any fever (over 100.4 F) or chills, persistent nausea/vomiting with inability to tolerate food or liquids, persistent diarrhea, or if your pain is not controlled on your discharge pain medications.  FOLLOW-UP:  1. Please call to make a follow-up appointment within one week of discharge with Dr. Sam.  2. Please follow up with your primary care physician in one week regarding your hospitalization.      SECONDARY DISCHARGE DIAGNOSES  Diagnosis: Chest pain  Assessment and Plan of Treatment: - s/p stress test and cardiac catheterization  - Continue aspirin 81mg and start plavix 75mg once daily   - Continue metoprolol 25mg and atorvastatin 40mg   -Pt to followup with Dr. Dada Alejandra (cardiology) after discharge. [phone]. [address] [place] [address]       PRINCIPAL DISCHARGE DIAGNOSIS  Diagnosis: Other intra-abdominal and pelvic swelling, mass and lump  Assessment and Plan of Treatment: WOUND CARE: You may shower. Pat Dry abdomen. Leave the white steri strips in place, they will fall off on their own in approximately 5-7 days.    BATHING: Please do not submerge wound underwater. You may shower and/or sponge bathe.  ACTIVITY: No heavy lifting anything more than 10-15lbs or straining. Otherwise, you may return to your usual level of physical activity. If you are taking narcotic pain medication (such as Percocet), do NOT drive a car, operate machinery or make important decisions.  DIET: Low fiber diet  NOTIFY YOUR SURGEON IF: You have any bleeding that does not stop, any pus draining from your wound, any fever (over 100.4 F) or chills, persistent nausea/vomiting with inability to tolerate food or liquids, persistent diarrhea, or if your pain is not controlled on your discharge pain medications.  FOLLOW-UP:  1. Please call to make a follow-up appointment within one week of discharge with Dr. Sam.  2. Please follow up with your primary care physician in one week regarding your hospitalization.      SECONDARY DISCHARGE DIAGNOSES  Diagnosis: Chest pain  Assessment and Plan of Treatment: - s/p stress test and cardiac catheterization  - Continue metoprolol 25mg and atorvastatin 40mg -prescriptions sent to pharmacy  -Pt to followup with Dr. Dada Alejandra (cardiology) after discharge. [phone]. [address] [place] [address], [address], [address]

## 2022-08-30 NOTE — DISCHARGE NOTE NURSING/CASE MANAGEMENT/SOCIAL WORK - PATIENT PORTAL LINK FT
You can access the FollowMyHealth Patient Portal offered by [facility] by registering at the following website: [email or web address]. By joining "[employer]’s FollowMyHealth portal, you will also be able to view your health information using other applications (apps) compatible with our system.

## 2022-08-31 ENCOUNTER — NON-APPOINTMENT (OUTPATIENT)
Age: 57
End: 2022-08-31

## 2022-09-01 ENCOUNTER — NON-APPOINTMENT (OUTPATIENT)
Age: 57
End: 2022-09-01

## 2022-09-01 PROBLEM — K21.9 GASTRO-ESOPHAGEAL REFLUX DISEASE WITHOUT ESOPHAGITIS: Chronic | Status: ACTIVE | Noted: 2022-08-18

## 2022-09-01 LAB — SURGICAL PATHOLOGY STUDY: SIGNIFICANT CHANGE UP

## 2022-09-02 ENCOUNTER — NON-APPOINTMENT (OUTPATIENT)
Age: 57
End: 2022-09-02

## 2022-09-06 ENCOUNTER — APPOINTMENT (OUTPATIENT)
Dept: SURGERY | Facility: CLINIC | Age: 57
End: 2022-09-06

## 2022-09-06 ENCOUNTER — RESULT REVIEW (OUTPATIENT)
Age: 57
End: 2022-09-06

## 2022-09-06 VITALS
HEART RATE: 99 BPM | WEIGHT: 285 LBS | BODY MASS INDEX: 38.6 KG/M2 | SYSTOLIC BLOOD PRESSURE: 125 MMHG | HEIGHT: 72 IN | DIASTOLIC BLOOD PRESSURE: 84 MMHG

## 2022-09-06 DIAGNOSIS — R10.11 RIGHT UPPER QUADRANT PAIN: ICD-10-CM

## 2022-09-06 DIAGNOSIS — R10.9 UNSPECIFIED ABDOMINAL PAIN: ICD-10-CM

## 2022-09-06 DIAGNOSIS — B18.1 CHRONIC VIRAL HEPATITIS B W/OUT DELTA-AGENT: ICD-10-CM

## 2022-09-06 PROCEDURE — 99024 POSTOP FOLLOW-UP VISIT: CPT

## 2022-09-12 NOTE — ASSESSMENT
[FreeTextEntry1] : 57y.o. 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 Final pathology revealed a tailgut cyst.

## 2022-09-12 NOTE — REVIEW OF SYSTEMS
[Abdominal Pain] : abdominal pain [Negative] : Neurological [As Noted in HPI] : as noted in HPI [Fever] : no fever [Chills] : no chills [Feeling Poorly] : not feeling poorly [Feeling Tired] : not feeling tired [Eye Pain] : no eye pain [Earache] : no earache [Chest Pain] : no chest pain [Shortness Of Breath] : no shortness of breath [Vomiting] : no vomiting [Constipation] : no constipation [Diarrhea] : no diarrhea [FreeTextEntry7] : RUQ pain [FreeTextEntry8] : Decrease urinary output which has been improving since surgery

## 2022-09-12 NOTE — PHYSICAL EXAM
[Exam Deferred] : exam was deferred [Normal Rate and Rhythm] : normal rate and rhythm [No Edema] : No edema [Alert] : alert [Oriented to Person] : oriented to person [Oriented to Place] : oriented to place [Oriented to Time] : oriented to time [JVD] : no jugular venous distention  [de-identified] : soft, obese, non-distended, non-tender to palpation. Incisions c/d/i w/o erythema or fluctuance but some surrounding ecchymosis [de-identified] : awake, alert, in NAD [de-identified] : normocephalic, atraumatic, EOMI, nl conjunctiva [de-identified] : b/l chest rise, EWOB on RA [de-identified] : deferred [de-identified] : normal strength [de-identified] : abdominal incisions as above [de-identified] : normal mood and affect

## 2022-09-27 ENCOUNTER — APPOINTMENT (OUTPATIENT)
Dept: ULTRASOUND IMAGING | Facility: CLINIC | Age: 57
End: 2022-09-27

## 2022-09-27 ENCOUNTER — OUTPATIENT (OUTPATIENT)
Dept: OUTPATIENT SERVICES | Facility: HOSPITAL | Age: 57
LOS: 1 days | End: 2022-09-27
Payer: MEDICAID

## 2022-09-27 DIAGNOSIS — R10.11 RIGHT UPPER QUADRANT PAIN: ICD-10-CM

## 2022-09-27 DIAGNOSIS — R10.9 UNSPECIFIED ABDOMINAL PAIN: ICD-10-CM

## 2022-09-27 DIAGNOSIS — Z98.890 OTHER SPECIFIED POSTPROCEDURAL STATES: Chronic | ICD-10-CM

## 2022-09-27 DIAGNOSIS — Z00.8 ENCOUNTER FOR OTHER GENERAL EXAMINATION: ICD-10-CM

## 2022-09-27 DIAGNOSIS — E89.2 POSTPROCEDURAL HYPOPARATHYROIDISM: Chronic | ICD-10-CM

## 2022-09-27 PROCEDURE — 76705 ECHO EXAM OF ABDOMEN: CPT | Mod: 26

## 2022-09-27 PROCEDURE — 76705 ECHO EXAM OF ABDOMEN: CPT

## 2022-10-13 ENCOUNTER — APPOINTMENT (OUTPATIENT)
Dept: SURGERY | Facility: CLINIC | Age: 57
End: 2022-10-13

## 2022-10-31 ENCOUNTER — NON-APPOINTMENT (OUTPATIENT)
Age: 57
End: 2022-10-31

## 2022-11-10 ENCOUNTER — APPOINTMENT (OUTPATIENT)
Dept: HEPATOLOGY | Facility: CLINIC | Age: 57
End: 2022-11-10

## 2022-11-15 ENCOUNTER — APPOINTMENT (OUTPATIENT)
Dept: OTOLARYNGOLOGY | Facility: CLINIC | Age: 57
End: 2022-11-15

## 2022-11-18 NOTE — HISTORY OF PRESENT ILLNESS
[FreeTextEntry1] : covid test - not detected 6-4-22\par covid vaccination\par alert and oriented x 3\par accompanied by son Reg [phone]/translates into Mandarin per patient request\par no reported falls\par no medications taken this morning  [FreeTextEntry5] : yesterday 7pm  [FreeTextEntry6] : Dr Son 24

## 2023-01-06 ENCOUNTER — APPOINTMENT (OUTPATIENT)
Dept: COLORECTAL SURGERY | Facility: CLINIC | Age: 58
End: 2023-01-06
Payer: MEDICAID

## 2023-01-06 PROCEDURE — 99213 OFFICE O/P EST LOW 20 MIN: CPT

## 2023-01-11 RX ORDER — HYDROCORTISONE ACETATE 25 MG/1
25 SUPPOSITORY RECTAL
Qty: 12 | Refills: 2 | Status: ACTIVE | COMMUNITY
Start: 2023-01-11 | End: 1900-01-01

## 2023-01-13 RX ORDER — HYDROCORTISONE 25 MG/G
2.5 CREAM TOPICAL
Qty: 1 | Refills: 1 | Status: ACTIVE | COMMUNITY
Start: 2023-01-13 | End: 1900-01-01

## 2023-01-18 ENCOUNTER — APPOINTMENT (OUTPATIENT)
Dept: PULMONOLOGY | Facility: CLINIC | Age: 58
End: 2023-01-18

## 2023-01-18 NOTE — DISCHARGE NOTE PROVIDER - HOSPITAL COURSE
January 18, 2023       Becky Baez MD  [address] [address]  Via Fax: [phone]      Patient: Sung Gunderson   YOB: 1958   Date of Visit: 1/18/2023       Dear Dr. Baez:    I saw your patient, Sung Gunderson, for an evaluation. Below are my notes for this visit with him.    If you have questions, please do not hesitate to call me.      Sincerely,        Carlos Smith MD        CC: No Recipients  Carlos Smith MD  1/18/2023  3:55 PM  Signed        Hematology/Oncology office visit note     01/18/23     Diagnosis:  1. Chronic lymphocytic leukemia initial diagnosis 2015, FISH negative for 11q22.3, 12 crissy, 13q14.3, 13q34 and TP53   -S/P one cycle of FCR 10/2017   -On Ibrutinib since 06/2018-01/2021      2. 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 T2NxMx -stage IIB  -Cisplatin 1/20/21 & 1/21/21  -Cisplatin 1/27/21 & 1/28/21  -Cisplatin 2/3/21 & 2/4/21  -Cisplatin 2/4/21   -Cisplatin 2/17/21   -Cisplatin 3/321 & 3/4/21  -Cisplatin 3/10/21 & 3/11/21   -Completed XRT 3/12/2021      Subjective: Here for follow up given-at least T2NxM0 Urothelial cancer S/P 5  weeks of cisplatin with XRT & CLL     Doing well  Has seen PCP  Had Colonoscopy last year for rectal bleeding   Findings post XRT were seen   Had recent recurrent bleeding   No recent infection   No use of antibiotics  No current pain   Appetite is good  No weight loss  No palpable lesions   No SOB  No chest pain   No cough   No current GI symptoms   No recent Urological work up   Will have thyroid & carotid US done   No palpable lesions             Past medical history: reviewed and unchanged   Surgical history: reviewed and unchanged   Family history: reviewed and unchanged     Review of Systems  General: Denies fevers, chills, night sweats, unintentional weight loss. Fatigue is resolved    HEENT: Denies dysphagia, odynophagia, epistaxis, mouth sores, gingival bleeding   CV  Denies chest pain,  PND, orthopnea, LE edema   Respiratory Denies SOB, cough or hemoptysis   GI Denies nausea, vomiting, diarrhea, abdominal pain, GI bleeding    Urinary symptoms are improved. No hematuria   MS Denies back pain, arthralgia   Endo Denies symptoms   HEM Denies lymphadenopathy or bleeding   Neuro Denies headaches, focal weakness, paresthesia, seizures   Psych Denies symptoms   Skin Denies symptoms      Vitals:    01/18/23 1044   BP: 124/87   Pulse: 100   Temp: 97.5 °F (36.4 °C)         Performance Status: ECOG 0    Physical Exam  General: no acute distress, well developed   HEENT RUBEN, face mask in place   NECK symmetric, no palpable mass   CV RRR, S1 and S2 normal, no murmurs, pulses are palpable, no peripheral edema   Respiratory breathing is not labored, CTA BL   GI soft, non tender, non distended, no hepatomegaly, no splenomegaly, no palpable mass, positive BS   MS no clubbing, no joint deformity   Skin warm, dry, no rash or visible lesions   Psych mood and affect are normal   Neuro CN functions are preserved, motor exam is preserved, no deficits   Lymphatics no palpable LAD in neck, axillary or inguinal area       Labs:  WBC (K/mcL)   Date Value   01/18/2023 12.1 (H)     RBC (mil/mcL)   Date Value   01/18/2023 4.73     HCT (%)   Date Value   01/18/2023 42.9     HGB (g/dL)   Date Value   01/18/2023 13.8     PLT (K/mcL)   Date Value   01/18/2023 272        Latest Reference Range & Units 1/18/23 10:41   Sodium 135 - 145 mmol/L 139   Potassium 3.4 - 5.1 mmol/L 5.1   Chloride 97 - 110 mmol/L 107   CO2 21 - 32 mmol/L 27   ANION GAP 7 - 19 mmol/L 10   Glucose 70 - 99 mg/dL 110 (H)   BUN 6 - 20 mg/dL 43 (H)   Creatinine 0.67 - 1.17 mg/dL 2.17 (H)   BUN/CREATININE RATIO 7 - 25  20   Glomerular Filtration Rate >=60  33 (L)   CALCIUM 8.4 - 10.2 mg/dL 8.9   TOTAL BILIRUBIN 0.2 - 1.0 mg/dL 0.4   AST/SGOT <=37 Units/L 17   ALT/SGPT <64 Units/L 27   ALK PHOSPHATASE 45 - 117 Units/L 85   Albumin 3.6 - 5.1 g/dL  This is a 58 y/o male with PMH of hyperparathyroid, hepatitis B on vemlidy, GERD,  c/o RLQ abdominal  and pelvic pain , CT revealed presacral mass with some compression on the rectum, he presents today for ERP robotic assisted pelvic mass excision, flexible sigmoid, possible low anterior resection on 8/25/22. On 8/25, pt s/p Robotic Laparoscopic Assist Presacral Cyst Excision.  In the postop setting, patient was having chest pain, EKG with T wave inversion and Troponin 6. Cards consulted. Repeat Troponin ordered at midnight as per their rec which was 6. Cards recommended a loading dose of 600 mg Plavix which was given in the AM. Cards will try to add on for ?diagnostic cath. The only request they made to preop this patient was to order repeat COVID test which was negative. Patient without chest pain all night, vitals stable. POD1- PT eval: home with no skilled needs. Cards rec: asa/plavix, metoprolol/statin, Pharm stress test showed: "large, moderate defects in the inferior, inferoapical, apical lateral, basal septal, and basal inferolateral walls that are partially reversible suggestive of infarct with at least moderate savanah-infarct ischemia" and Plan for cath on 8/29.  Patient diet was advanced and tolerated and having GI fxn.  ON 8/29, pt underwent cardiac cath without stent placement. PAtient tolerated procedure well without complications. [facility] dc [facility] and __________. At the time of discharge, the patient was hemodynamically stable, was tolerating PO diet, was voiding urine and passing stool, was ambulating, and was comfortable with adequate pain control. The patient was instructed to follow up with Dr. Sam and Dr. Bang Wick and PMD within 1-2 weeks after discharge from the hospital. The patient felt comfortable with discharge. The patient was discharged to home. The patient had no other issues.         3.7   GLOBULIN 2.0 - 4.0 g/dL 3.0   A/G Ratio, Serum 1.0 - 2.4  1.2   TOTAL PROTEIN 6.4 - 8.2 g/dL 6.7   LDH 86 - 234 Units/L 212   WBC 4.2 - 11.0 K/mcL 12.1 (H)   RBC 4.50 - 5.90 mil/mcL 4.73   HGB 13.0 - 17.0 g/dL 13.8   HCT 39.0 - 51.0 % 42.9   MCV 78.0 - 100.0 fl 90.7   MCH 26.0 - 34.0 pg 29.2   MCHC 32.0 - 36.5 g/dL 32.2   RDW-SD 39.0 - 50.0 fL 52.9 (H)   RDW-CV 11.0 - 15.0 % 15.9 (H)    - 450 K/mcL 272   NRBC <=0 /100 WBC 0   Neutrophil % 73   LYMPH % 12   MONO % 8   EOSIN % 5   BASO % 1   Absolute Neutrophil 1.8 - 7.7 K/mcL 8.9 (H)   Absolute Lymph 1.0 - 4.0 K/mcL 1.5   Absolute Mono 0.3 - 0.9 K/mcL 1.0 (H)   Absolute Eos 0.0 - 0.5 K/mcL 0.6 (H)   Absolute Baso 0.0 - 0.3 K/mcL 0.1   Immature Granulocytes % 1   Absolute Immature Granulocytes 0.0 - 0.2 K/mcL 0.1   (H): Data is abnormally high  (L): Data is abnormally low      Assessment/Plan:    1. 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 Chronic lymphocytic leukemia, initial diagnosis 2015:       -On Ibrutinib 420 mg PO QD   -Labs are improved -normal ALC & LDH   -LN biopsy consistent with CLL/SLL  -Will continue current treatment with Imbruvica 420 mg PO QD   -See me in three months     3. CKD   -From urinary obstruction & past use of cisplatin   -Creatinine levels fluctuate       7. Anemia from chemoXRT  -Resolved     Above plan was discussed with patient and all pertinent questions were answered.       Carlos Smith MD     This is a 58 y/o male with PMH of hyperparathyroid, hepatitis B on vemlidy, GERD,  c/o RLQ abdominal  and pelvic pain , CT revealed presacral mass with some compression on the rectum, he presents today for ERP robotic assisted pelvic mass excision, flexible sigmoid, possible low anterior resection on 8/25/22. On 8/25, pt s/p Robotic Laparoscopic Assist Presacral Cyst Excision.  In the postop setting, patient was having chest pain, EKG with T wave inversion and Troponin 6. Cards consulted. Repeat Troponin ordered at midnight as per their rec which was 6. Cards recommended a loading dose of 600 mg Plavix which was given in the AM. Cards will try to add on for ?diagnostic cath. The only request they made to preop this patient was to order repeat COVID test which was negative. Patient without chest pain all night, vitals stable. POD1- PT eval: home with no skilled needs. Cards rec: asa/plavix, metoprolol/statin, Pharm stress test showed: "large, moderate defects in the inferior, inferoapical, apical lateral, basal septal, and basal inferolateral walls that are partially reversible suggestive of infarct with at least moderate savanah-infarct ischemia" and Plan for cath on 8/29.  Patient diet was advanced and tolerated and having GI fxn.  ON 8/29, pt underwent cardiac cath without stent placement. PAtient tolerated procedure well without complications. [facility] and passed TOV. At the time of discharge, the patient was hemodynamically stable, was tolerating PO diet, was voiding urine and passing stool, was ambulating, and was comfortable with adequate pain control. The patient was instructed to follow up with Dr. Sam and Dr. Bang Wick and PMD within 1-2 weeks after discharge from the hospital. The patient felt comfortable with discharge. The patient was discharged to home. The patient had no other issues.         This is a 58 y/o male with PMH of hyperparathyroid, hepatitis B on vemlidy, GERD,  c/o RLQ abdominal  and pelvic pain , CT revealed presacral mass with some compression on the rectum, he presents today for ERP robotic assisted pelvic mass excision, flexible sigmoid, possible low anterior resection on 8/25/22. On 8/25, pt s/p Robotic Laparoscopic Assist Presacral Cyst Excision.  In the postop setting, patient was having chest pain, EKG with T wave inversion and Troponin 6. Cards consulted. Repeat Troponin ordered at midnight as per their rec which was 6. Cards recommended a loading dose of 600 mg Plavix which was given in the AM. Cards will try to add on for ?diagnostic cath. The only request they made to preop this patient was to order repeat COVID test which was negative. Patient without chest pain all night, vitals stable. POD1- PT eval: home with no skilled needs. Cards rec: asa/plavix, metoprolol/statin, Pharm stress test showed: "large, moderate defects in the inferior, inferoapical, apical lateral, basal septal, and basal inferolateral walls that are partially reversible suggestive of infarct with at least moderate savanah-infarct ischemia" and Plan for cath on 8/29.  Patient diet was advanced and tolerated and having GI fxn.  ON 8/29, pt underwent cardiac cath without stent placement. PAtient tolerated procedure well without complications. [facility] dc at [facility] and passed TOV. At the time of discharge, the patient was hemodynamically stable, was tolerating PO diet, was voiding urine and passing stool, was ambulating, and was comfortable with adequate pain control. The patient was instructed to follow up with Dr. Sam and Cardio and PMD within 1-2 weeks after discharge from the hospital. The patient felt comfortable with discharge. The patient was discharged to home. The patient had no other issues.

## 2023-01-18 NOTE — PHYSICAL EXAM
[Posterior] : posteriorly [Respiratory Effort] : normal respiratory effort [Normal Rate and Rhythm] : normal rate and rhythm [JVD] : no jugular venous distention  [de-identified] : Well appearing

## 2023-01-18 NOTE — HISTORY OF PRESENT ILLNESS
[FreeTextEntry1] : 57 year old man who presents to the office today for evaluation of rectal pain. Of note, the patient underwent a robotic excision of a presacral cyst in August 2022; pathology was consistent with an epidermal cyst.\par \par Today the son is present at bedside and is used to help with translation. \par \par Recently, the patient has been experience excruciating perianal pain for approximately 2-3 hours following bowel movements. There is also some blood when he has a bowel movements.

## 2023-01-18 NOTE — ASSESSMENT
[FreeTextEntry1] : 57 year old man with posterior midline anal fissure identified on examination and classic symptomatology.\par \par The patient will take anusol suppositories at night. I will prescribe nifedepine ointment. He was instructed to increase his water intake to 6-8 glasses of water per day. He will also add metamucil to his diet.\par \par I would like to see the patient again in the office in around 4 weeks. If symptoms persist at that time, will consider surgical therapy.

## 2023-02-15 ENCOUNTER — APPOINTMENT (OUTPATIENT)
Dept: COLORECTAL SURGERY | Facility: CLINIC | Age: 58
End: 2023-02-15
Payer: MEDICAID

## 2023-02-15 PROCEDURE — 99212 OFFICE O/P EST SF 10 MIN: CPT

## 2023-03-01 ENCOUNTER — OUTPATIENT (OUTPATIENT)
Dept: OUTPATIENT SERVICES | Facility: HOSPITAL | Age: 58
LOS: 1 days | End: 2023-03-01
Payer: MEDICAID

## 2023-03-01 VITALS
WEIGHT: 274.03 LBS | HEART RATE: 82 BPM | SYSTOLIC BLOOD PRESSURE: 120 MMHG | OXYGEN SATURATION: 98 % | HEIGHT: 73 IN | DIASTOLIC BLOOD PRESSURE: 70 MMHG | TEMPERATURE: 98 F | RESPIRATION RATE: 20 BRPM

## 2023-03-01 DIAGNOSIS — Z98.890 OTHER SPECIFIED POSTPROCEDURAL STATES: Chronic | ICD-10-CM

## 2023-03-01 DIAGNOSIS — Z01.818 ENCOUNTER FOR OTHER PREPROCEDURAL EXAMINATION: ICD-10-CM

## 2023-03-01 DIAGNOSIS — E89.2 POSTPROCEDURAL HYPOPARATHYROIDISM: Chronic | ICD-10-CM

## 2023-03-01 DIAGNOSIS — K60.1 CHRONIC ANAL FISSURE: ICD-10-CM

## 2023-03-01 DIAGNOSIS — Z87.898 PERSONAL HISTORY OF OTHER SPECIFIED CONDITIONS: Chronic | ICD-10-CM

## 2023-03-01 LAB
ALBUMIN SERPL ELPH-MCNC: 4.7 G/DL — SIGNIFICANT CHANGE UP (ref 3.3–5)
ALP SERPL-CCNC: 57 U/L — SIGNIFICANT CHANGE UP (ref 40–120)
ALT FLD-CCNC: 15 U/L — SIGNIFICANT CHANGE UP (ref 10–45)
ANION GAP SERPL CALC-SCNC: 10 MMOL/L — SIGNIFICANT CHANGE UP (ref 5–17)
AST SERPL-CCNC: 15 U/L — SIGNIFICANT CHANGE UP (ref 10–40)
BILIRUB SERPL-MCNC: 0.2 MG/DL — SIGNIFICANT CHANGE UP (ref 0.2–1.2)
BUN SERPL-MCNC: 20 MG/DL — SIGNIFICANT CHANGE UP (ref 7–23)
CALCIUM SERPL-MCNC: 10.7 MG/DL — HIGH (ref 8.4–10.5)
CHLORIDE SERPL-SCNC: 104 MMOL/L — SIGNIFICANT CHANGE UP (ref 96–108)
CO2 SERPL-SCNC: 25 MMOL/L — SIGNIFICANT CHANGE UP (ref 22–31)
CREAT SERPL-MCNC: 0.82 MG/DL — SIGNIFICANT CHANGE UP (ref 0.5–1.3)
EGFR: 101 ML/MIN/1.73M2 — SIGNIFICANT CHANGE UP
GLUCOSE SERPL-MCNC: 100 MG/DL — HIGH (ref 70–99)
HCT VFR BLD CALC: 44.7 % — SIGNIFICANT CHANGE UP (ref 39–50)
HGB BLD-MCNC: 15 G/DL — SIGNIFICANT CHANGE UP (ref 13–17)
MCHC RBC-ENTMCNC: 32.7 PG — SIGNIFICANT CHANGE UP (ref 27–34)
MCHC RBC-ENTMCNC: 33.6 GM/DL — SIGNIFICANT CHANGE UP (ref 32–36)
MCV RBC AUTO: 97.4 FL — SIGNIFICANT CHANGE UP (ref 80–100)
NRBC # BLD: 0 /100 WBCS — SIGNIFICANT CHANGE UP (ref 0–0)
PLATELET # BLD AUTO: 145 K/UL — LOW (ref 150–400)
POTASSIUM SERPL-MCNC: 4.9 MMOL/L — SIGNIFICANT CHANGE UP (ref 3.5–5.3)
POTASSIUM SERPL-SCNC: 4.9 MMOL/L — SIGNIFICANT CHANGE UP (ref 3.5–5.3)
PROT SERPL-MCNC: 7.9 G/DL — SIGNIFICANT CHANGE UP (ref 6–8.3)
RBC # BLD: 4.59 M/UL — SIGNIFICANT CHANGE UP (ref 4.2–5.8)
RBC # FLD: 12.4 % — SIGNIFICANT CHANGE UP (ref 10.3–14.5)
SODIUM SERPL-SCNC: 139 MMOL/L — SIGNIFICANT CHANGE UP (ref 135–145)
WBC # BLD: 5.52 K/UL — SIGNIFICANT CHANGE UP (ref 3.8–10.5)
WBC # FLD AUTO: 5.52 K/UL — SIGNIFICANT CHANGE UP (ref 3.8–10.5)

## 2023-03-01 PROCEDURE — 80053 COMPREHEN METABOLIC PANEL: CPT

## 2023-03-01 PROCEDURE — G0463: CPT

## 2023-03-01 PROCEDURE — 85027 COMPLETE CBC AUTOMATED: CPT

## 2023-03-01 RX ORDER — PANTOPRAZOLE SODIUM 20 MG/1
1 TABLET, DELAYED RELEASE ORAL
Qty: 0 | Refills: 0 | DISCHARGE

## 2023-03-01 RX ORDER — LIDOCAINE HCL 20 MG/ML
0.2 VIAL (ML) INJECTION ONCE
Refills: 0 | Status: DISCONTINUED | OUTPATIENT
Start: 2023-03-10 | End: 2023-03-24

## 2023-03-01 RX ORDER — SODIUM CHLORIDE 9 MG/ML
1000 INJECTION, SOLUTION INTRAVENOUS
Refills: 0 | Status: DISCONTINUED | OUTPATIENT
Start: 2023-03-10 | End: 2023-03-24

## 2023-03-01 RX ORDER — FAMOTIDINE 10 MG/ML
1 INJECTION INTRAVENOUS
Qty: 0 | Refills: 0 | DISCHARGE

## 2023-03-01 NOTE — H&P PST ADULT - HISTORY OF PRESENT ILLNESS
57 year old  male with history  of hyperparathyroid, hepatitis B on Vemlidy GERD,  c/o RLQ abdominal  and pelvic pain - found to have presacral mass with some compression on the rectum - s/p  ERP robotic assisted pelvic mass excision, flexible sigmoid, possible low anterior resection on 8/25/22- benign. Pt with c/o blood in stool for several years - found to have a fissure , Now in for Fissurectomy , possible sphincterectomy on 3/10/2023.     Denies Recent travel, Exposure or Covid symptoms  Covid test- 3/7/2023

## 2023-03-01 NOTE — H&P PST ADULT - NSICDXPASTMEDICALHX_GEN_ALL_CORE_FT
PAST MEDICAL HISTORY:  Benign lipomatous neoplasm of skin and subcutaneous tissue of head, face and neck     GERD (gastroesophageal reflux disease)     H/O pelvic mass     Hepatitis B on Vemlidy    Hyperparathyroidism, unspecified     Obesity      PAST MEDICAL HISTORY:  Benign lipomatous neoplasm of skin and subcutaneous tissue of head, face and neck     CAD (coronary artery disease)     GERD (gastroesophageal reflux disease)     H/O pelvic mass     Hepatitis B on Vemlidy    Hyperparathyroidism, unspecified     Obesity

## 2023-03-01 NOTE — H&P PST ADULT - ASSESSMENT
Airway:  normal    Mallampati- 3      Dental: Patient denies loose teeth    Activity : WALK 1 hr daily , Taking care of baby   Dasi mets : 8 dasi mets    Airway:  normal    Mallampati- 3      Dental: Patient denies loose teeth    Activity : WALK 1 hr daily , Taking care of grand child   Dasi mets : 8 dasi mets

## 2023-03-01 NOTE — H&P PST ADULT - NSICDXPASTSURGICALHX_GEN_ALL_CORE_FT
PAST SURGICAL HISTORY:  H/O excision of mass     H/O pelvic mass     History of parathyroidectomy     History of varicose vein stripping laser

## 2023-03-01 NOTE — H&P PST ADULT - ADDITIONAL COMMENTS
pt understands minimal english & speaks minimal english also, pt wants son to translate & refused  services

## 2023-03-04 NOTE — ASSESSMENT
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

## 2023-03-04 NOTE — HISTORY OF PRESENT ILLNESS
[FreeTextEntry1] : 57 year old man who came to the office in January and was found to have what appeared to be a chronic anal fissure. The patient was treated with medical therapy and presents today for follow up.\par \par The prescribed treatment plan worked for approximately 4 weeks, but the pain associated with anal fissure has returned and it is unbearable.\par \par The patient and his son are here to discuss further options.

## 2023-03-04 NOTE — PHYSICAL EXAM
[FreeTextEntry1] : Anorectal examination: visual inspection reveals a posterior midline anal skin tag and associated chronic, deep posterior midline anal fissure

## 2023-03-07 ENCOUNTER — OUTPATIENT (OUTPATIENT)
Dept: OUTPATIENT SERVICES | Facility: HOSPITAL | Age: 58
LOS: 1 days | End: 2023-03-07
Payer: MEDICAID

## 2023-03-07 DIAGNOSIS — Z11.52 ENCOUNTER FOR SCREENING FOR COVID-19: ICD-10-CM

## 2023-03-07 DIAGNOSIS — Z98.890 OTHER SPECIFIED POSTPROCEDURAL STATES: Chronic | ICD-10-CM

## 2023-03-07 DIAGNOSIS — E89.2 POSTPROCEDURAL HYPOPARATHYROIDISM: Chronic | ICD-10-CM

## 2023-03-07 DIAGNOSIS — Z87.898 PERSONAL HISTORY OF OTHER SPECIFIED CONDITIONS: Chronic | ICD-10-CM

## 2023-03-07 LAB — SARS-COV-2 RNA SPEC QL NAA+PROBE: SIGNIFICANT CHANGE UP

## 2023-03-07 PROCEDURE — C9803: CPT

## 2023-03-07 PROCEDURE — U0005: CPT

## 2023-03-07 PROCEDURE — U0003: CPT

## 2023-03-09 ENCOUNTER — TRANSCRIPTION ENCOUNTER (OUTPATIENT)
Age: 58
End: 2023-03-09

## 2023-03-10 ENCOUNTER — APPOINTMENT (OUTPATIENT)
Dept: COLORECTAL SURGERY | Facility: HOSPITAL | Age: 58
End: 2023-03-10
Payer: MEDICAID

## 2023-03-10 ENCOUNTER — RESULT REVIEW (OUTPATIENT)
Age: 58
End: 2023-03-10

## 2023-03-10 ENCOUNTER — TRANSCRIPTION ENCOUNTER (OUTPATIENT)
Age: 58
End: 2023-03-10

## 2023-03-10 ENCOUNTER — OUTPATIENT (OUTPATIENT)
Dept: OUTPATIENT SERVICES | Facility: HOSPITAL | Age: 58
LOS: 1 days | End: 2023-03-10
Payer: MEDICAID

## 2023-03-10 VITALS
SYSTOLIC BLOOD PRESSURE: 134 MMHG | WEIGHT: 274.03 LBS | RESPIRATION RATE: 16 BRPM | HEIGHT: 73 IN | TEMPERATURE: 98 F | HEART RATE: 85 BPM | DIASTOLIC BLOOD PRESSURE: 87 MMHG | OXYGEN SATURATION: 96 %

## 2023-03-10 VITALS
DIASTOLIC BLOOD PRESSURE: 89 MMHG | HEART RATE: 80 BPM | OXYGEN SATURATION: 98 % | SYSTOLIC BLOOD PRESSURE: 139 MMHG | RESPIRATION RATE: 16 BRPM

## 2023-03-10 DIAGNOSIS — E89.2 POSTPROCEDURAL HYPOPARATHYROIDISM: Chronic | ICD-10-CM

## 2023-03-10 DIAGNOSIS — Z98.890 OTHER SPECIFIED POSTPROCEDURAL STATES: Chronic | ICD-10-CM

## 2023-03-10 DIAGNOSIS — K60.1 CHRONIC ANAL FISSURE: ICD-10-CM

## 2023-03-10 DIAGNOSIS — Z87.898 PERSONAL HISTORY OF OTHER SPECIFIED CONDITIONS: Chronic | ICD-10-CM

## 2023-03-10 PROCEDURE — 46200 REMOVAL OF ANAL FISSURE: CPT

## 2023-03-10 PROCEDURE — C1889: CPT

## 2023-03-10 PROCEDURE — 46080 SPHNCTROTMY ANAL DIV SPHNCTR: CPT

## 2023-03-10 PROCEDURE — 88304 TISSUE EXAM BY PATHOLOGIST: CPT | Mod: 26

## 2023-03-10 PROCEDURE — 88304 TISSUE EXAM BY PATHOLOGIST: CPT

## 2023-03-10 DEVICE — SURGICEL FIBRILLAR 2 X 4": Type: IMPLANTABLE DEVICE | Status: FUNCTIONAL

## 2023-03-10 RX ORDER — TENOFOVIR DISOPROXIL FUMARATE 300 MG/1
1 TABLET, FILM COATED ORAL
Qty: 0 | Refills: 0 | DISCHARGE

## 2023-03-10 RX ORDER — OXYCODONE HYDROCHLORIDE 5 MG/1
10 TABLET ORAL ONCE
Refills: 0 | Status: DISCONTINUED | OUTPATIENT
Start: 2023-03-10 | End: 2023-03-10

## 2023-03-10 RX ORDER — OXYCODONE HYDROCHLORIDE 5 MG/1
1 TABLET ORAL
Qty: 5 | Refills: 0
Start: 2023-03-10

## 2023-03-10 RX ORDER — CHOLECALCIFEROL (VITAMIN D3) 125 MCG
1 CAPSULE ORAL
Qty: 0 | Refills: 0 | DISCHARGE

## 2023-03-10 RX ORDER — SODIUM CHLORIDE 9 MG/ML
1000 INJECTION, SOLUTION INTRAVENOUS
Refills: 0 | Status: DISCONTINUED | OUTPATIENT
Start: 2023-03-10 | End: 2023-03-24

## 2023-03-10 RX ORDER — DOCUSATE SODIUM 100 MG
3 CAPSULE ORAL
Qty: 0 | Refills: 0 | DISCHARGE

## 2023-03-10 NOTE — ASU DISCHARGE PLAN (ADULT/PEDIATRIC) - CALL YOUR DOCTOR IF YOU HAVE ANY OF THE FOLLOWING:
Bleeding that does not stop/Pain not relieved by Medications/Numbness, tingling, color or temperature change to extremity/Nausea and vomiting that does not stop

## 2023-03-10 NOTE — BRIEF OPERATIVE NOTE - NSICDXBRIEFPROCEDURE_GEN_ALL_CORE_FT
PROCEDURES:  Anal sphincterotomy 10-Mar-2023 07:43:24  Richie Eric  Fissurectomy with sphincterotomy 10-Mar-2023 07:43:40  Richie Eric

## 2023-03-10 NOTE — ASU PATIENT PROFILE, ADULT - NSICDXPASTMEDICALHX_GEN_ALL_CORE_FT
PAST MEDICAL HISTORY:  Benign lipomatous neoplasm of skin and subcutaneous tissue of head, face and neck     CAD (coronary artery disease)     GERD (gastroesophageal reflux disease)     H/O pelvic mass     Hepatitis B on Vemlidy    Hyperparathyroidism, unspecified     Obesity

## 2023-03-10 NOTE — ASU DISCHARGE PLAN (ADULT/PEDIATRIC) - NS MD DC FALL RISK RISK
For information on Fall & Injury Prevention, visit: [email or web address] OR  [email or web address] OR  https://www.cdc.gov/steadi/patient.html

## 2023-03-10 NOTE — ASU PATIENT PROFILE, ADULT - MEDICATION ADMINISTRATION INFO, PROFILE
Metronidazole Counseling:  I discussed with the patient the risks of metronidazole including but not limited to seizures, nausea/vomiting, a metallic taste in the mouth, nausea/vomiting and severe allergy. no concerns

## 2023-03-10 NOTE — ASU DISCHARGE PLAN (ADULT/PEDIATRIC) - CARE PROVIDER_API CALL
Fei Jimenez)  ColonRectal Surgery; Surgery  [address], [address]  [place] [address]  Phone: [phone]  Fax: [phone]  Follow Up Time: 2 weeks

## 2023-03-10 NOTE — ASU DISCHARGE PLAN (ADULT/PEDIATRIC) - ASU DC SPECIAL INSTRUCTIONSFT
printed instructions given to patient  oxycodone sent to pharmacy  do not drive while taking prescription pain medication  sitz baths three times a day  IBU/Tylenol  Rectal packing will come out with first bowel movement  remove large dressing tomorrow and replace it with 4x4 gauze prn

## 2023-03-10 NOTE — ASU DISCHARGE PLAN (ADULT/PEDIATRIC) - NURSING INSTRUCTIONS
OK to take Tylenol/Acetaminophen  for pain and every 6 hours after as needed. OK to take Motrin/Ibuprofen for pain and every 6 hours after as needed.

## 2023-03-10 NOTE — ASU DISCHARGE PLAN (ADULT/PEDIATRIC) - HAVE YOU HAD COVID IN THE LAST 60 DAYS?
Reviewed self and infant care with mom, she verbalizes understanding of instruction reviewed. Encouraged to follow up with MD's as directed with questions/concerns. Pt interested in renting pump. Will set one aside for her.
No

## 2023-03-13 PROBLEM — Z87.898 PERSONAL HISTORY OF OTHER SPECIFIED CONDITIONS: Chronic | Status: ACTIVE | Noted: 2023-03-01

## 2023-03-13 PROBLEM — E66.9 OBESITY, UNSPECIFIED: Chronic | Status: ACTIVE | Noted: 2023-03-01

## 2023-03-13 PROBLEM — I25.10 ATHEROSCLEROTIC HEART DISEASE OF NATIVE CORONARY ARTERY WITHOUT ANGINA PECTORIS: Chronic | Status: ACTIVE | Noted: 2023-03-01

## 2023-03-16 LAB — SURGICAL PATHOLOGY STUDY: SIGNIFICANT CHANGE UP

## 2023-03-22 ENCOUNTER — APPOINTMENT (OUTPATIENT)
Dept: COLORECTAL SURGERY | Facility: CLINIC | Age: 58
End: 2023-03-22
Payer: MEDICAID

## 2023-03-22 PROCEDURE — 99024 POSTOP FOLLOW-UP VISIT: CPT

## 2023-03-23 NOTE — HISTORY OF PRESENT ILLNESS
[FreeTextEntry1] : Pleasant 57-year-old  male who presents for his first postoperative visit.\par \par The patient is approximately 12 days status post examination under anesthesia, debridement of chronic anal fissure and partial lateral internal sphincterotomy.\par \par Patient states that he is feeling much better.  Preoperatively he rated his pain at 6 out of 10.  Now it is a 0-1 out of 10.  He is moving his bowels with much less difficulty and as mentioned above, much less pain.  He denies significant rectal bleeding but is still having some ongoing drainage.\par \par The patient was examined in the left lateral decubitus position.  Inspection of the anorectal area reveals that his fissurectomy and sphincterotomy sites are clean with no evidence of infection.  The wounds are still open and draining some serous fluid.\par \par Patient was reminded to leave a gauze pad in the anal area to absorb any moisture.  I want him to remain on a bulk forming laxative.\par \par I will see him in 4 weeks for follow-up visit.

## 2023-04-17 NOTE — DIETITIAN INITIAL EVALUATION ADULT - NAME AND PHONE
A (CATHETER 6FR XB3.5 CRV 100CM VISTA BRTP XL LUM RADOPQ GUIDE) guide catheter was removed. Alison Kleiner, RD, [facility] Pager # [phone]

## 2023-04-19 ENCOUNTER — APPOINTMENT (OUTPATIENT)
Dept: COLORECTAL SURGERY | Facility: CLINIC | Age: 58
End: 2023-04-19
Payer: MEDICAID

## 2023-04-19 PROCEDURE — 99212 OFFICE O/P EST SF 10 MIN: CPT

## 2023-04-19 NOTE — PHYSICAL EXAM
[Abdomen Masses] : No abdominal masses [JVD] : no jugular venous distention  [Respiratory Effort] : normal respiratory effort [Normal Rate and Rhythm] : normal rate and rhythm [de-identified] : No distress [de-identified] : NCAT [FreeTextEntry1] : Anorectal Examination: Patient placed in left lateral decubitus position. Inspection reveals well healed posterior midline fissure with epithelial tissue. Left lateral incision site with granulation tissue, silver nirate applied.

## 2023-04-19 NOTE — HISTORY OF PRESENT ILLNESS
[FreeTextEntry1] : 57 year old man who presents to the office today for follow up. Son provides translation.\par \par On March 10, 2023 patient underwent an examination under anesthesia, debridement of anal fissure, and partial lateral internal sphincterotomy.\par \par Since the procedure, he has felt much better and pain is significantly improved. He still has intermittent mild blood per rectum following bowel movements.

## 2023-04-19 NOTE — ASSESSMENT
[FreeTextEntry1] : 57 year old man who is approximately 6 weeks s/p lateral internal sphincterotomy for anal fissure. He is recuperating nicely and pain is significantly improved.\par \par He is still having a mild amount of blood per rectum after bowel movements, which is likely secondary to hemorrhoidal disease. He had a colonoscopy approximately 1.5 years ago. Out of an abundance of caution, we will repeat a colonoscopy in approximately 1 year to rule out any colorectal lesions.

## 2023-07-25 ENCOUNTER — APPOINTMENT (OUTPATIENT)
Dept: UROLOGY | Facility: CLINIC | Age: 58
End: 2023-07-25
Payer: MEDICAID

## 2023-07-25 VITALS
HEIGHT: 72 IN | WEIGHT: 265 LBS | BODY MASS INDEX: 35.89 KG/M2 | HEART RATE: 78 BPM | RESPIRATION RATE: 14 BRPM | SYSTOLIC BLOOD PRESSURE: 129 MMHG | OXYGEN SATURATION: 95 % | DIASTOLIC BLOOD PRESSURE: 82 MMHG | TEMPERATURE: 98.5 F

## 2023-07-25 DIAGNOSIS — R39.9 UNSPECIFIED SYMPTOMS AND SIGNS INVOLVING THE GENITOURINARY SYSTEM: ICD-10-CM

## 2023-07-25 PROCEDURE — 99204 OFFICE O/P NEW MOD 45 MIN: CPT

## 2023-07-25 RX ORDER — ATORVASTATIN CALCIUM 80 MG/1
TABLET, FILM COATED ORAL
Refills: 0 | Status: ACTIVE | COMMUNITY

## 2023-07-25 RX ORDER — METOPROLOL TARTRATE 75 MG/1
TABLET, FILM COATED ORAL
Refills: 0 | Status: ACTIVE | COMMUNITY

## 2023-07-25 RX ORDER — TAMSULOSIN HYDROCHLORIDE 0.4 MG/1
0.4 CAPSULE ORAL
Qty: 30 | Refills: 11 | Status: ACTIVE | COMMUNITY
Start: 2023-07-25 | End: 1900-01-01

## 2023-07-25 NOTE — HISTORY OF PRESENT ILLNESS
[FreeTextEntry1] : Language: English\par Date of First visit: 07/25/2023 \par Accompanied by: self\par Contact info: \par Referring Provider/PCP: Smitha Wheeler of [facility]\par Fax: [phone]\par \par \par CC/ Problem List:\par LUTS\par \par ===============================================================================\par FIRST VISIT / Summary:\par Very pleasant 57 year old M here for LUTS. He had a presacral cyst excised approx 1 year ago. No issues urinating after the surgery. LUTS for around 8 years but worsening. Has constipation only 1 BM every 2-3 days. He is drinking 4-5L of fluids daily. \par \par IPSS 15 bother 3\par \par \par -------------------------------------------------------------------------------------------\par INTERVAL VISITS:\par \par ===============================================================================\par \par PMH: Hepatitis B\par Meds: atorvastatin, metoprolol, vemlidy, stool softeners\par All: nkda\par FHx: \par SocHx: \par \par PSH: 2022 pelvic cyst excision.\par \par ROS: Review of Systems is as per HPI unless otherwise denoted below\par \par \par ===============================================================================\par DATA: \par \par LABS (SELECTED):---------------------------------------------------------------------------------------------------\par \par \par RADS:-------------------------------------------------------------------------------------------------------------------\par 6/23/22: CT abd pelvis, presacral cyst, prostate 7.3 x 6 x 4.9cm approx 110 grams\par \par PATHOLOGY/CYTOLOGY:-------------------------------------------------------------------------------------------\par \par \par VOIDING STUDIES: ----------------------------------------------------------------------------------------------------\par 07/25/2023 \par \par STONE STUDIES: (Analysis/LLSA)----------------------------------------------------------------------------------\par \par \par PROCEDURES: -----------------------------------------------------------------------------------------------\par \par \par \par \par ===============================================================================\par \par PHYSICAL EXAM:\par \par GEN: AAOx3, NAD\par \par PSYCH: Appropriate Behavior, Affect Congruent\par \par Lungs: No labored breathing\par \par GAIT: Gait normal, Stability good\par \par ABD: no suprapubic or CVAT\par \par \par  FOCUSED: ----------------------------------------------------------------------------------------------------------------\par \par \par =======================================================================================\par DISCUSSION: \par =======================================================================================\par ASSESSMENT and PLAN\par \par \par 1. LUTS\par - prostate approx 110 cc, elevated PVR 208cc\par - treat constipation\par - drinking over 4L fluids daily - reduce to 3L plus any sweat loss\par - can trial tamsulosin and return in 1 month\par - if improved will not know if behavioral only or medication resulted in improvement\par \par 2. PSA screening\par - bring PCP blood tests back\par - f/u in 2 weeks\par \par \par =======================================================================================\par \par Thank you for allowing me to assist in the care of your patient. Should you have any questions please do not hesitate to reach out to me.\par \par \par Declan Valle MD                                                            \par [facility]\par [facility]\par \par [place] Office:\par [address], [address]   \[place] [address]    \par T: [phone]    \par F: [phone]    \par \par [place] Office:\par [address] [place] [address], 1[address] floor\[place] [address]\par T: [phone]\par F: [phone]

## 2023-07-26 LAB
APPEARANCE: CLEAR
BACTERIA: NEGATIVE /HPF
BILIRUBIN URINE: NEGATIVE
BLOOD URINE: NEGATIVE
CAST: 0 /LPF
COLOR: YELLOW
EPITHELIAL CELLS: 0 /HPF
GLUCOSE QUALITATIVE U: NEGATIVE MG/DL
KETONES URINE: NEGATIVE MG/DL
LEUKOCYTE ESTERASE URINE: NEGATIVE
MICROSCOPIC-UA: NORMAL
NITRITE URINE: NEGATIVE
PH URINE: 6
PROTEIN URINE: NEGATIVE MG/DL
RED BLOOD CELLS URINE: 0 /HPF
SPECIFIC GRAVITY URINE: 1.01
UROBILINOGEN URINE: 0.2 MG/DL
WHITE BLOOD CELLS URINE: 0 /HPF

## 2023-07-27 LAB — BACTERIA UR CULT: NORMAL

## 2023-08-01 ENCOUNTER — APPOINTMENT (OUTPATIENT)
Dept: OTOLARYNGOLOGY | Facility: CLINIC | Age: 58
End: 2023-08-01

## 2023-08-02 NOTE — DISCHARGE NOTE NURSING/CASE MANAGEMENT/SOCIAL WORK - NSDCPEFALRISK_GEN_ALL_CORE
For information on Fall & Injury Prevention, visit: [email or web address] OR  [email or web address] OR  https://www.cdc.gov/steadi/patient.html
Otc Regimen: Wart stick once daily
Detail Level: Zone

## 2023-10-23 NOTE — PHYSICAL EXAM
Pt arrives to ED with mother. Pt's mother reports that he woke up this morning with wheezing and cough. Mother reports sister was sick this past week.    [JVD] : no jugular venous distention  [Ankle Swelling (On Exam)] : not present [Varicose Veins Of Lower Extremities] : present [Varicose Veins Of The Left Leg] : of the left leg [Ankle Swelling On The Right] : mild [Skin Ulcer] : no ulcer [Alert] : alert [Calm] : calm [de-identified] : appears well  [de-identified] : incisions clean and dry, ecchymosis over the thigh, calf with some settleing around the medial malleolus, no signs of infection

## 2024-04-23 ENCOUNTER — NON-APPOINTMENT (OUTPATIENT)
Age: 59
End: 2024-04-23

## 2024-04-24 ENCOUNTER — APPOINTMENT (OUTPATIENT)
Dept: SPINE | Facility: CLINIC | Age: 59
End: 2024-04-24
Payer: COMMERCIAL

## 2024-04-24 VITALS
HEART RATE: 86 BPM | OXYGEN SATURATION: 97 % | DIASTOLIC BLOOD PRESSURE: 89 MMHG | BODY MASS INDEX: 35.89 KG/M2 | WEIGHT: 265 LBS | HEIGHT: 72 IN | SYSTOLIC BLOOD PRESSURE: 135 MMHG

## 2024-04-24 DIAGNOSIS — G95.0 SYRINGOMYELIA AND SYRINGOBULBIA: ICD-10-CM

## 2024-04-24 PROCEDURE — 99204 OFFICE O/P NEW MOD 45 MIN: CPT

## 2024-07-05 NOTE — DISCHARGE NOTE PROVIDER - NSDCQMPCI_CARD_ALL_CORE
Decrease morning Risperdal to 0.25 mg    Continue evening Risperdal 0.5 mg    Trial without Paxil noted and discussed    Update labs for fatigue and dizziness    Monitor blood pressure for fatigue and dizziness  
No

## 2025-07-23 NOTE — PATIENT PROFILE ADULT - VISION (WITH CORRECTIVE LENSES IF THE PATIENT USUALLY WEARS THEM):
[employer] [facility] at [phone]   Normal vision: sees adequately in most situations; can see medication labels, newsprint

## (undated) DEVICE — DRSG COMBINE 5X9"

## (undated) DEVICE — DRAPE 3/4 SHEET W REINFORCEMENT 56X77"

## (undated) DEVICE — XI ARM FORCEP TENACULUM

## (undated) DEVICE — SOL IRR POUR NS 0.9% 500ML

## (undated) DEVICE — SUCTION YANKAUER NO CONTROL VENT

## (undated) DEVICE — MARKING PEN W RULER

## (undated) DEVICE — DRSG DERMABOND 0.7ML

## (undated) DEVICE — XI ARM PERMANENT CAUTERY HOOK

## (undated) DEVICE — SOL IRR POUR H2O 250ML

## (undated) DEVICE — SUT CHROMIC 3-0 30" V-20

## (undated) DEVICE — GLV 6.5 PROTEXIS (WHITE)

## (undated) DEVICE — XI ARM NEEDLE DRIVER LARGE

## (undated) DEVICE — XI SEAL UNIV 5- 8 MM

## (undated) DEVICE — DRAPE LIGHT HANDLE COVER (BLUE)

## (undated) DEVICE — DRAPE INSTRUMENT POUCH 6.75" X 11"

## (undated) DEVICE — DRSG OPSITE 13.75 X 4"

## (undated) DEVICE — Device

## (undated) DEVICE — PACK MINOR

## (undated) DEVICE — GOWN TRIMAX LG

## (undated) DEVICE — CATH IV SAFE BC 20G X 1.16" (PINK)

## (undated) DEVICE — DRAIN PENROSE .5" X 18" LATEX

## (undated) DEVICE — BRUSH COLONOSCOPY CYTOLOGY

## (undated) DEVICE — XI ARM DISSECTOR CURVED BIPOLAR 8MM

## (undated) DEVICE — DRAPE GENERAL ENDOSCOPY

## (undated) DEVICE — IRRIGATOR BIO SHIELD

## (undated) DEVICE — XI ARM PERMANENT CAUTERY SPATULA

## (undated) DEVICE — XI ARM GRASPER TIP UP FENESTRATED

## (undated) DEVICE — VESSEL LOOP MAXI-RED  0.120" X 16"

## (undated) DEVICE — WARMING BLANKET UPPER ADULT

## (undated) DEVICE — DRAPE MAYO STAND 30"

## (undated) DEVICE — SYR LUER LOK 10CC

## (undated) DEVICE — OSTOMY KIT 2-PIECE 4" NS (YELLOW)

## (undated) DEVICE — DRAPE 1/2 SHEET 40X57"

## (undated) DEVICE — APPLICATOR FOR TISSEEL DUPLOTIP W SNAP LOCK 5MMX32CM

## (undated) DEVICE — SCOPE WARMER SEAL DISP

## (undated) DEVICE — LUBRICANT INST ELECTROLUBE Z SOLUTION

## (undated) DEVICE — XI OBTURATOR OPTICAL BLADELESS 8MM

## (undated) DEVICE — GLV 8.5 PROTEXIS (WHITE)

## (undated) DEVICE — DRSG CURITY GAUZE SPONGE 4 X 4" 12-PLY

## (undated) DEVICE — NDL HYPO SAFE 30G X .5" (YELLOW)

## (undated) DEVICE — CATH IV SAFE BC 22G X 1" (BLUE)

## (undated) DEVICE — XI ARM SCISSOR MONO CURVED

## (undated) DEVICE — XI ARM FORCEP FENESTRATED BIPOLAR 8MM

## (undated) DEVICE — TUBING IV SET GRAVITY 3Y 100" MACRO

## (undated) DEVICE — CLAMP BX HOT RAD JAW 3

## (undated) DEVICE — PACK BASIN SPECIAL PROCEDURE

## (undated) DEVICE — GLV 7.5 PROTEXIS (WHITE)

## (undated) DEVICE — XI VESSEL SEALER

## (undated) DEVICE — FOLEY HOLDER STATLOCK 2 WAY ADULT

## (undated) DEVICE — SUT CHROMIC 2-0 30" V-20

## (undated) DEVICE — XI ENDOWRIST STAPLER SHEATH

## (undated) DEVICE — FORCEP RADIAL JAW 4 JUMBO 2.8MM 3.2MM 240CM ORANGE DISP

## (undated) DEVICE — ENDOCATCH 10MM SPECIMEN POUCH

## (undated) DEVICE — DRSG TELFA 3 X 8

## (undated) DEVICE — XI ARM CLIP APPLIER MEDIUM-LARGE

## (undated) DEVICE — TROCAR SURGIQUEST AIRSEAL 5MM X 100MM

## (undated) DEVICE — BLADE SCALPEL SAFETYLOCK #15

## (undated) DEVICE — DRSG TEGADERM 6"X8"

## (undated) DEVICE — TROCAR COVIDIEN BLUNT TIP HASSAN 12MM

## (undated) DEVICE — XI DRAPE ARM

## (undated) DEVICE — MEDICATION LABELS W MARKER

## (undated) DEVICE — XI DRAPE COLUMN

## (undated) DEVICE — INSUFFLATION NDL COVIDIEN STEP 14G FOR STEP/VERSASTEP

## (undated) DEVICE — COMP INJECTION ELEVIEW AMPOULES 10ML

## (undated) DEVICE — LUBRICATING JELLY ONESHOT 1.25OZ

## (undated) DEVICE — FOLEY TRAY 16FR 5CC LTX UMETER CLOSED

## (undated) DEVICE — DRAPE BACK TABLE COVER HEAVY DUTY 60"

## (undated) DEVICE — POSITIONER STRAP ARMBOARD VELCRO TS-30

## (undated) DEVICE — ELCTR GROUNDING PAD ADULT COVIDIEN

## (undated) DEVICE — DRAPE TOWEL BLUE 17" X 24"

## (undated) DEVICE — GLV 8 PROTEXIS (WHITE)

## (undated) DEVICE — CATH IV SAFE INSYTE 18G X 1.88" (GREEN)

## (undated) DEVICE — DRAPE IOBAN 23" X 23"

## (undated) DEVICE — PREP BETADINE SPONGE STICKS

## (undated) DEVICE — TAPE SILK 3"

## (undated) DEVICE — NDL COUNTER FOAM AND MAGNET 40-70

## (undated) DEVICE — TROCAR SURGIQUEST AIRSEAL 12MMX100MM

## (undated) DEVICE — DRAPE THYROID 77" X 123"

## (undated) DEVICE — TROCAR COVIDIEN ENDO CLOSE SUTURING DEVICE

## (undated) DEVICE — SPECIMEN CONTAINER 100ML

## (undated) DEVICE — XI ARM FORCEP MARYLAND BIPOLAR

## (undated) DEVICE — SYR LUER LOK 50CC

## (undated) DEVICE — PREP CHLORAPREP HI-LITE ORANGE 26ML

## (undated) DEVICE — SENSOR O2 FINGER ADULT

## (undated) DEVICE — POSITIONER PINK PAD PIGAZZI SYSTEM

## (undated) DEVICE — POSITIONER PURPLE ARM ONE STEP (LARGE)

## (undated) DEVICE — SOL INJ NS 0.9% 500ML 2 PORT

## (undated) DEVICE — STAPLER SKIN VISI-STAT 35 WIDE

## (undated) DEVICE — XI ARM NEEDLE DRIVER MEGA

## (undated) DEVICE — XI ARM CLIP APPLIER LARGE

## (undated) DEVICE — URETERAL CATH RED RUBBER 14FR (GREEN)

## (undated) DEVICE — TUBING SUCTION 20FT

## (undated) DEVICE — GLV 7 PROTEXIS (WHITE)

## (undated) DEVICE — DRSG STERISTRIPS 0.5 X 4"

## (undated) DEVICE — BIOPSY FORCEP RADIAL JAW 4 STANDARD WITH NEEDLE

## (undated) DEVICE — XI ARM FORCEP PROGRASP 8MM

## (undated) DEVICE — TUBING SUCTION CONN 6FT STERILE

## (undated) DEVICE — D HELP - CLEARVIEW CLEARIFY SYSTEM

## (undated) DEVICE — SYR ASEPTO

## (undated) DEVICE — VENODYNE/SCD SLEEVE CALF MEDIUM

## (undated) DEVICE — BLADE SCALPEL SAFETYLOCK #10

## (undated) DEVICE — POLY TRAP ETRAP

## (undated) DEVICE — PACK MAJOR ABDOMINAL SUPINE

## (undated) DEVICE — TROCAR APPLIED MEDICAL KII BALLOON BLUNT TIP 12MM X 100MM

## (undated) DEVICE — STAPLER COVIDIEN ENDO GIA STANDARD HANDLE

## (undated) DEVICE — PACK IV START WITH CHG

## (undated) DEVICE — VENODYNE/SCD SLEEVE CALF LARGE